# Patient Record
Sex: MALE | Race: WHITE | NOT HISPANIC OR LATINO | Employment: OTHER
[De-identification: names, ages, dates, MRNs, and addresses within clinical notes are randomized per-mention and may not be internally consistent; named-entity substitution may affect disease eponyms.]

---

## 2017-12-21 ENCOUNTER — RECORDS - HEALTHEAST (OUTPATIENT)
Dept: ADMINISTRATIVE | Facility: OTHER | Age: 57
End: 2017-12-21

## 2018-01-24 ENCOUNTER — COMMUNICATION - HEALTHEAST (OUTPATIENT)
Dept: NEUROSURGERY | Facility: CLINIC | Age: 58
End: 2018-01-24

## 2018-01-24 DIAGNOSIS — M54.9 BACK PAIN: ICD-10-CM

## 2018-01-31 ENCOUNTER — RECORDS - HEALTHEAST (OUTPATIENT)
Dept: RADIOLOGY | Facility: CLINIC | Age: 58
End: 2018-01-31

## 2018-01-31 ENCOUNTER — RECORDS - HEALTHEAST (OUTPATIENT)
Dept: ADMINISTRATIVE | Facility: OTHER | Age: 58
End: 2018-01-31

## 2018-03-13 ENCOUNTER — RECORDS - HEALTHEAST (OUTPATIENT)
Dept: ADMINISTRATIVE | Facility: OTHER | Age: 58
End: 2018-03-13

## 2018-03-29 ENCOUNTER — AMBULATORY - HEALTHEAST (OUTPATIENT)
Dept: NEUROSURGERY | Facility: CLINIC | Age: 58
End: 2018-03-29

## 2018-04-17 ENCOUNTER — AMBULATORY - HEALTHEAST (OUTPATIENT)
Dept: NEUROSURGERY | Facility: CLINIC | Age: 58
End: 2018-04-17

## 2018-04-17 ENCOUNTER — OFFICE VISIT - HEALTHEAST (OUTPATIENT)
Dept: NEUROSURGERY | Facility: CLINIC | Age: 58
End: 2018-04-17

## 2018-04-17 ENCOUNTER — COMMUNICATION - HEALTHEAST (OUTPATIENT)
Dept: NEUROSURGERY | Facility: CLINIC | Age: 58
End: 2018-04-17

## 2018-04-17 DIAGNOSIS — M54.16 LUMBAR RADICULOPATHY: ICD-10-CM

## 2018-04-17 DIAGNOSIS — M51.16 RADICULOPATHY DUE TO LUMBAR INTERVERTEBRAL DISC DISORDER: ICD-10-CM

## 2018-04-17 DIAGNOSIS — M54.12 RADICULOPATHY OF CERVICAL REGION: ICD-10-CM

## 2018-04-17 ASSESSMENT — MIFFLIN-ST. JEOR: SCORE: 1374.64

## 2018-05-02 ENCOUNTER — HOSPITAL ENCOUNTER (OUTPATIENT)
Dept: RADIOLOGY | Facility: CLINIC | Age: 58
Discharge: HOME | End: 2018-05-02
Attending: NEUROLOGICAL SURGERY

## 2018-05-02 ENCOUNTER — OFFICE VISIT - HEALTHEAST (OUTPATIENT)
Dept: PHYSICAL THERAPY | Facility: REHABILITATION | Age: 58
End: 2018-05-02

## 2018-05-02 DIAGNOSIS — M62.81 MUSCLE WEAKNESS (GENERALIZED): ICD-10-CM

## 2018-05-02 DIAGNOSIS — M54.12 RADICULITIS OF LEFT CERVICAL REGION: ICD-10-CM

## 2018-05-02 DIAGNOSIS — M54.12 RADICULOPATHY OF CERVICAL REGION: ICD-10-CM

## 2018-05-02 DIAGNOSIS — M54.16 RIGHT LUMBAR RADICULITIS: ICD-10-CM

## 2018-05-02 DIAGNOSIS — R29.3 POOR POSTURE: ICD-10-CM

## 2018-05-02 DIAGNOSIS — M54.9 BACK PAIN: ICD-10-CM

## 2018-05-04 ENCOUNTER — OFFICE VISIT - HEALTHEAST (OUTPATIENT)
Dept: PHYSICAL THERAPY | Facility: REHABILITATION | Age: 58
End: 2018-05-04

## 2018-05-04 DIAGNOSIS — M62.81 MUSCLE WEAKNESS (GENERALIZED): ICD-10-CM

## 2018-05-04 DIAGNOSIS — R29.3 POOR POSTURE: ICD-10-CM

## 2018-05-04 DIAGNOSIS — M54.16 RIGHT LUMBAR RADICULITIS: ICD-10-CM

## 2018-05-04 DIAGNOSIS — M54.12 RADICULITIS OF LEFT CERVICAL REGION: ICD-10-CM

## 2018-05-16 ENCOUNTER — COMMUNICATION - HEALTHEAST (OUTPATIENT)
Dept: NEUROSURGERY | Facility: CLINIC | Age: 58
End: 2018-05-16

## 2018-05-16 ENCOUNTER — OFFICE VISIT - HEALTHEAST (OUTPATIENT)
Dept: PHYSICAL THERAPY | Facility: REHABILITATION | Age: 58
End: 2018-05-16

## 2018-05-16 DIAGNOSIS — M54.12 RADICULITIS OF LEFT CERVICAL REGION: ICD-10-CM

## 2018-05-16 DIAGNOSIS — M62.81 MUSCLE WEAKNESS (GENERALIZED): ICD-10-CM

## 2018-05-16 DIAGNOSIS — M54.16 RIGHT LUMBAR RADICULITIS: ICD-10-CM

## 2018-05-16 DIAGNOSIS — R29.3 POOR POSTURE: ICD-10-CM

## 2018-05-16 DIAGNOSIS — M79.606 LEG PAIN: ICD-10-CM

## 2018-05-18 ENCOUNTER — OFFICE VISIT - HEALTHEAST (OUTPATIENT)
Dept: PHYSICAL THERAPY | Facility: REHABILITATION | Age: 58
End: 2018-05-18

## 2018-05-18 DIAGNOSIS — M62.81 MUSCLE WEAKNESS (GENERALIZED): ICD-10-CM

## 2018-05-18 DIAGNOSIS — M54.16 RIGHT LUMBAR RADICULITIS: ICD-10-CM

## 2018-05-18 DIAGNOSIS — R29.3 POOR POSTURE: ICD-10-CM

## 2018-05-18 DIAGNOSIS — M54.12 RADICULITIS OF LEFT CERVICAL REGION: ICD-10-CM

## 2018-05-23 ENCOUNTER — OFFICE VISIT - HEALTHEAST (OUTPATIENT)
Dept: PHYSICAL THERAPY | Facility: REHABILITATION | Age: 58
End: 2018-05-23

## 2018-05-23 DIAGNOSIS — M54.16 RIGHT LUMBAR RADICULITIS: ICD-10-CM

## 2018-05-23 DIAGNOSIS — M62.81 MUSCLE WEAKNESS (GENERALIZED): ICD-10-CM

## 2018-05-23 DIAGNOSIS — M54.12 RADICULITIS OF LEFT CERVICAL REGION: ICD-10-CM

## 2018-05-23 DIAGNOSIS — R29.3 POOR POSTURE: ICD-10-CM

## 2018-06-04 ENCOUNTER — COMMUNICATION - HEALTHEAST (OUTPATIENT)
Dept: NEUROSURGERY | Facility: CLINIC | Age: 58
End: 2018-06-04

## 2018-06-22 ENCOUNTER — COMMUNICATION - HEALTHEAST (OUTPATIENT)
Dept: NEUROSURGERY | Facility: CLINIC | Age: 58
End: 2018-06-22

## 2018-06-26 ENCOUNTER — RECORDS - HEALTHEAST (OUTPATIENT)
Dept: RADIOLOGY | Facility: CLINIC | Age: 58
End: 2018-06-26

## 2018-06-26 ENCOUNTER — RECORDS - HEALTHEAST (OUTPATIENT)
Dept: ADMINISTRATIVE | Facility: OTHER | Age: 58
End: 2018-06-26

## 2018-06-27 ENCOUNTER — AMBULATORY - HEALTHEAST (OUTPATIENT)
Dept: NEUROSURGERY | Facility: CLINIC | Age: 58
End: 2018-06-27

## 2018-06-27 DIAGNOSIS — M25.551 HIP PAIN, RIGHT: ICD-10-CM

## 2018-06-29 ENCOUNTER — HOSPITAL ENCOUNTER (OUTPATIENT)
Dept: PHYSICAL MEDICINE AND REHAB | Facility: CLINIC | Age: 58
Discharge: HOME | End: 2018-06-29
Attending: NEUROLOGICAL SURGERY

## 2018-06-29 DIAGNOSIS — M48.061 LUMBAR STENOSIS: ICD-10-CM

## 2018-06-29 DIAGNOSIS — M79.18 MYOFASCIAL PAIN: ICD-10-CM

## 2018-06-29 DIAGNOSIS — M54.50 LUMBAR SPINE PAIN: ICD-10-CM

## 2018-06-29 DIAGNOSIS — M51.369 DDD (DEGENERATIVE DISC DISEASE), LUMBAR: ICD-10-CM

## 2018-06-29 ASSESSMENT — MIFFLIN-ST. JEOR: SCORE: 1347.43

## 2018-07-19 ENCOUNTER — HOSPITAL ENCOUNTER (OUTPATIENT)
Dept: PHYSICAL MEDICINE AND REHAB | Facility: CLINIC | Age: 58
Discharge: HOME | End: 2018-07-19
Attending: PHYSICAL MEDICINE & REHABILITATION

## 2018-07-19 DIAGNOSIS — M79.18 MYOFASCIAL PAIN: ICD-10-CM

## 2018-07-19 DIAGNOSIS — F16.283 FLASHBACKS (H): ICD-10-CM

## 2018-07-19 DIAGNOSIS — M54.16 LUMBAR RADICULAR PAIN: ICD-10-CM

## 2018-07-19 DIAGNOSIS — M48.061 LUMBAR FORAMINAL STENOSIS: ICD-10-CM

## 2018-07-19 DIAGNOSIS — M51.369 DDD (DEGENERATIVE DISC DISEASE), LUMBAR: ICD-10-CM

## 2018-07-19 DIAGNOSIS — M54.50 LUMBAR SPINE PAIN: ICD-10-CM

## 2018-07-19 DIAGNOSIS — M25.551 HIP PAIN, RIGHT: ICD-10-CM

## 2018-07-19 ASSESSMENT — MIFFLIN-ST. JEOR: SCORE: 1342.89

## 2018-07-25 ENCOUNTER — HOSPITAL ENCOUNTER (OUTPATIENT)
Dept: MRI IMAGING | Facility: CLINIC | Age: 58
Discharge: HOME | End: 2018-07-25
Attending: PHYSICAL MEDICINE & REHABILITATION

## 2018-07-25 DIAGNOSIS — M25.551 HIP PAIN, RIGHT: ICD-10-CM

## 2018-07-27 ENCOUNTER — COMMUNICATION - HEALTHEAST (OUTPATIENT)
Dept: PHYSICAL MEDICINE AND REHAB | Facility: CLINIC | Age: 58
End: 2018-07-27

## 2018-08-03 ENCOUNTER — HOSPITAL ENCOUNTER (OUTPATIENT)
Dept: PHYSICAL MEDICINE AND REHAB | Facility: CLINIC | Age: 58
Discharge: HOME | End: 2018-08-03
Attending: PHYSICAL MEDICINE & REHABILITATION

## 2018-08-03 DIAGNOSIS — M54.16 LUMBAR RADICULAR PAIN: ICD-10-CM

## 2018-08-03 DIAGNOSIS — M48.061 LUMBAR FORAMINAL STENOSIS: ICD-10-CM

## 2018-08-03 DIAGNOSIS — M51.369 DDD (DEGENERATIVE DISC DISEASE), LUMBAR: ICD-10-CM

## 2018-08-03 DIAGNOSIS — M54.50 LUMBAR SPINE PAIN: ICD-10-CM

## 2018-08-03 DIAGNOSIS — Z72.820 POOR SLEEP: ICD-10-CM

## 2018-08-03 ASSESSMENT — MIFFLIN-ST. JEOR: SCORE: 1338.35

## 2018-09-04 ENCOUNTER — OFFICE VISIT - HEALTHEAST (OUTPATIENT)
Dept: NEUROSURGERY | Facility: CLINIC | Age: 58
End: 2018-09-04

## 2018-09-04 DIAGNOSIS — M54.10 RADICULAR LEG PAIN: ICD-10-CM

## 2018-09-06 ENCOUNTER — HOSPITAL ENCOUNTER (OUTPATIENT)
Dept: RADIOLOGY | Facility: CLINIC | Age: 58
Discharge: HOME | End: 2018-09-06
Attending: NEUROLOGICAL SURGERY

## 2018-09-06 ENCOUNTER — HOSPITAL ENCOUNTER (OUTPATIENT)
Dept: MRI IMAGING | Facility: CLINIC | Age: 58
Discharge: HOME | End: 2018-09-06
Attending: NEUROLOGICAL SURGERY

## 2018-09-06 DIAGNOSIS — M54.10 RADICULAR LEG PAIN: ICD-10-CM

## 2018-09-17 ENCOUNTER — HOSPITAL ENCOUNTER (OUTPATIENT)
Dept: PHYSICAL MEDICINE AND REHAB | Facility: CLINIC | Age: 58
Discharge: HOME | End: 2018-09-17
Attending: PHYSICAL MEDICINE & REHABILITATION

## 2018-09-17 DIAGNOSIS — F43.10 PTSD (POST-TRAUMATIC STRESS DISORDER): ICD-10-CM

## 2018-09-18 ENCOUNTER — OFFICE VISIT - HEALTHEAST (OUTPATIENT)
Dept: NEUROSURGERY | Facility: CLINIC | Age: 58
End: 2018-09-18

## 2018-09-18 DIAGNOSIS — M54.16 LUMBAR RADICULOPATHY: ICD-10-CM

## 2018-09-24 ENCOUNTER — COMMUNICATION - HEALTHEAST (OUTPATIENT)
Dept: NEUROSURGERY | Facility: CLINIC | Age: 58
End: 2018-09-24

## 2018-09-26 ENCOUNTER — RECORDS - HEALTHEAST (OUTPATIENT)
Dept: ADMINISTRATIVE | Facility: OTHER | Age: 58
End: 2018-09-26

## 2018-10-03 ENCOUNTER — AMBULATORY - HEALTHEAST (OUTPATIENT)
Dept: NEUROSURGERY | Facility: CLINIC | Age: 58
End: 2018-10-03

## 2018-10-04 ENCOUNTER — OFFICE VISIT - HEALTHEAST (OUTPATIENT)
Dept: NEUROSURGERY | Facility: CLINIC | Age: 58
End: 2018-10-04

## 2018-10-04 DIAGNOSIS — Z01.818 PRE-OP EXAM: ICD-10-CM

## 2018-10-08 ENCOUNTER — AMBULATORY - HEALTHEAST (OUTPATIENT)
Dept: LAB | Facility: CLINIC | Age: 58
End: 2018-10-08

## 2018-10-08 DIAGNOSIS — Z01.818 PRE-OP EXAM: ICD-10-CM

## 2018-10-08 LAB — CLOSURE TME COLL+EPINEP BLD: 95 SEC (ref 1–180)

## 2018-10-09 ENCOUNTER — ANESTHESIA - HEALTHEAST (OUTPATIENT)
Dept: SURGERY | Facility: CLINIC | Age: 58
End: 2018-10-09

## 2018-10-09 ENCOUNTER — SURGERY - HEALTHEAST (OUTPATIENT)
Dept: SURGERY | Facility: CLINIC | Age: 58
End: 2018-10-09

## 2018-10-09 ASSESSMENT — MIFFLIN-ST. JEOR
SCORE: 1321.57
SCORE: 1342.89

## 2018-10-10 ENCOUNTER — COMMUNICATION - HEALTHEAST (OUTPATIENT)
Dept: NEUROSURGERY | Facility: CLINIC | Age: 58
End: 2018-10-10

## 2018-10-10 DIAGNOSIS — G89.18 ACUTE POST-OPERATIVE PAIN: ICD-10-CM

## 2018-10-10 DIAGNOSIS — M54.9 BACK PAIN: ICD-10-CM

## 2018-10-26 ENCOUNTER — AMBULATORY - HEALTHEAST (OUTPATIENT)
Dept: NEUROSURGERY | Facility: CLINIC | Age: 58
End: 2018-10-26

## 2018-10-31 ENCOUNTER — HOSPITAL ENCOUNTER (OUTPATIENT)
Dept: PHYSICAL MEDICINE AND REHAB | Facility: CLINIC | Age: 58
Discharge: HOME | End: 2018-10-31
Attending: SOCIAL WORKER

## 2018-10-31 DIAGNOSIS — F43.10 PTSD (POST-TRAUMATIC STRESS DISORDER): ICD-10-CM

## 2018-11-06 ENCOUNTER — HOSPITAL ENCOUNTER (OUTPATIENT)
Dept: PHYSICAL MEDICINE AND REHAB | Facility: CLINIC | Age: 58
Discharge: HOME | End: 2018-11-06
Attending: SOCIAL WORKER

## 2018-11-06 ENCOUNTER — AMBULATORY - HEALTHEAST (OUTPATIENT)
Dept: PHYSICAL MEDICINE AND REHAB | Facility: CLINIC | Age: 58
End: 2018-11-06

## 2018-11-06 DIAGNOSIS — F43.10 PTSD (POST-TRAUMATIC STRESS DISORDER): ICD-10-CM

## 2018-11-12 ENCOUNTER — HOSPITAL ENCOUNTER (OUTPATIENT)
Dept: PHYSICAL MEDICINE AND REHAB | Facility: CLINIC | Age: 58
Discharge: HOME | End: 2018-11-12
Attending: SOCIAL WORKER

## 2018-11-12 DIAGNOSIS — F43.10 PTSD (POST-TRAUMATIC STRESS DISORDER): ICD-10-CM

## 2018-11-16 ENCOUNTER — OFFICE VISIT - HEALTHEAST (OUTPATIENT)
Dept: NEUROSURGERY | Facility: CLINIC | Age: 58
End: 2018-11-16

## 2018-11-16 DIAGNOSIS — M54.16 LUMBAR RADICULOPATHY: ICD-10-CM

## 2018-11-16 ASSESSMENT — MIFFLIN-ST. JEOR: SCORE: 1342.89

## 2018-12-10 ENCOUNTER — HOSPITAL ENCOUNTER (OUTPATIENT)
Dept: PHYSICAL MEDICINE AND REHAB | Facility: CLINIC | Age: 58
Discharge: HOME | End: 2018-12-10
Attending: SOCIAL WORKER

## 2018-12-10 DIAGNOSIS — F43.10 PTSD (POST-TRAUMATIC STRESS DISORDER): ICD-10-CM

## 2018-12-18 ENCOUNTER — HOSPITAL ENCOUNTER (OUTPATIENT)
Dept: PHYSICAL MEDICINE AND REHAB | Facility: CLINIC | Age: 58
Discharge: HOME | End: 2018-12-18
Attending: SOCIAL WORKER

## 2018-12-18 DIAGNOSIS — F43.10 PTSD (POST-TRAUMATIC STRESS DISORDER): ICD-10-CM

## 2019-01-07 ENCOUNTER — HOSPITAL ENCOUNTER (OUTPATIENT)
Dept: PHYSICAL MEDICINE AND REHAB | Facility: CLINIC | Age: 59
Discharge: HOME | End: 2019-01-07
Attending: SOCIAL WORKER

## 2019-01-07 DIAGNOSIS — F43.10 PTSD (POST-TRAUMATIC STRESS DISORDER): ICD-10-CM

## 2019-01-14 ENCOUNTER — HOSPITAL ENCOUNTER (OUTPATIENT)
Dept: PHYSICAL MEDICINE AND REHAB | Facility: CLINIC | Age: 59
Discharge: HOME | End: 2019-01-14
Attending: SOCIAL WORKER

## 2019-01-14 DIAGNOSIS — F43.10 PTSD (POST-TRAUMATIC STRESS DISORDER): ICD-10-CM

## 2019-01-21 ENCOUNTER — HOSPITAL ENCOUNTER (OUTPATIENT)
Dept: PHYSICAL MEDICINE AND REHAB | Facility: CLINIC | Age: 59
Discharge: HOME | End: 2019-01-21
Attending: SOCIAL WORKER

## 2019-01-21 DIAGNOSIS — F43.10 PTSD (POST-TRAUMATIC STRESS DISORDER): ICD-10-CM

## 2019-02-04 ENCOUNTER — HOSPITAL ENCOUNTER (OUTPATIENT)
Dept: PHYSICAL MEDICINE AND REHAB | Facility: CLINIC | Age: 59
Discharge: HOME | End: 2019-02-04
Attending: SOCIAL WORKER

## 2019-02-04 DIAGNOSIS — F43.10 PTSD (POST-TRAUMATIC STRESS DISORDER): ICD-10-CM

## 2019-03-04 ENCOUNTER — AMBULATORY - HEALTHEAST (OUTPATIENT)
Dept: PHYSICAL MEDICINE AND REHAB | Facility: CLINIC | Age: 59
End: 2019-03-04

## 2019-03-04 ENCOUNTER — HOSPITAL ENCOUNTER (OUTPATIENT)
Dept: PHYSICAL MEDICINE AND REHAB | Facility: CLINIC | Age: 59
Discharge: HOME | End: 2019-03-04
Attending: SOCIAL WORKER

## 2019-03-04 DIAGNOSIS — F43.10 PTSD (POST-TRAUMATIC STRESS DISORDER): ICD-10-CM

## 2019-03-19 ENCOUNTER — HOSPITAL ENCOUNTER (OUTPATIENT)
Dept: PHYSICAL MEDICINE AND REHAB | Facility: CLINIC | Age: 59
Discharge: HOME | End: 2019-03-19
Attending: SOCIAL WORKER

## 2019-03-19 DIAGNOSIS — F43.10 PTSD (POST-TRAUMATIC STRESS DISORDER): ICD-10-CM

## 2019-04-01 ENCOUNTER — HOSPITAL ENCOUNTER (OUTPATIENT)
Dept: PHYSICAL MEDICINE AND REHAB | Facility: CLINIC | Age: 59
Discharge: HOME | End: 2019-04-01
Attending: SOCIAL WORKER

## 2019-04-01 DIAGNOSIS — F43.10 PTSD (POST-TRAUMATIC STRESS DISORDER): ICD-10-CM

## 2019-04-15 ENCOUNTER — HOSPITAL ENCOUNTER (OUTPATIENT)
Dept: PHYSICAL MEDICINE AND REHAB | Facility: CLINIC | Age: 59
Discharge: HOME | End: 2019-04-15
Attending: SOCIAL WORKER

## 2019-04-15 DIAGNOSIS — F43.10 PTSD (POST-TRAUMATIC STRESS DISORDER): ICD-10-CM

## 2019-05-20 ENCOUNTER — HOSPITAL ENCOUNTER (OUTPATIENT)
Dept: PHYSICAL MEDICINE AND REHAB | Facility: CLINIC | Age: 59
Discharge: HOME | End: 2019-05-20
Attending: SOCIAL WORKER

## 2019-05-20 DIAGNOSIS — F43.10 PTSD (POST-TRAUMATIC STRESS DISORDER): ICD-10-CM

## 2019-07-15 ENCOUNTER — HOSPITAL ENCOUNTER (OUTPATIENT)
Dept: PHYSICAL MEDICINE AND REHAB | Facility: CLINIC | Age: 59
Discharge: HOME | End: 2019-07-15
Attending: SOCIAL WORKER

## 2019-07-15 DIAGNOSIS — F43.10 PTSD (POST-TRAUMATIC STRESS DISORDER): ICD-10-CM

## 2019-07-31 ENCOUNTER — AMBULATORY - HEALTHEAST (OUTPATIENT)
Dept: PHYSICAL MEDICINE AND REHAB | Facility: CLINIC | Age: 59
End: 2019-07-31

## 2020-08-21 ENCOUNTER — OFFICE VISIT - HEALTHEAST (OUTPATIENT)
Dept: INTERNAL MEDICINE | Facility: CLINIC | Age: 60
End: 2020-08-21

## 2020-08-21 DIAGNOSIS — E78.5 HYPERLIPIDEMIA LDL GOAL <100: ICD-10-CM

## 2020-08-21 DIAGNOSIS — Z72.51 UNPROTECTED SEXUAL INTERCOURSE: ICD-10-CM

## 2020-08-21 DIAGNOSIS — R30.0 DYSURIA: ICD-10-CM

## 2020-08-21 DIAGNOSIS — F11.11 HX OF OPIOID ABUSE (H): ICD-10-CM

## 2020-08-21 DIAGNOSIS — Z13.21 SCREENING FOR ENDOCRINE, NUTRITIONAL, METABOLIC AND IMMUNITY DISORDER: ICD-10-CM

## 2020-08-21 DIAGNOSIS — F43.10 PTSD (POST-TRAUMATIC STRESS DISORDER): ICD-10-CM

## 2020-08-21 DIAGNOSIS — I10 ESSENTIAL HYPERTENSION: ICD-10-CM

## 2020-08-21 DIAGNOSIS — Z00.00 ENCOUNTER FOR ANNUAL PHYSICAL EXAM: ICD-10-CM

## 2020-08-21 DIAGNOSIS — F10.20 ALCOHOLISM (H): ICD-10-CM

## 2020-08-21 DIAGNOSIS — R06.02 SOB (SHORTNESS OF BREATH): ICD-10-CM

## 2020-08-21 DIAGNOSIS — Z13.0 SCREENING FOR ENDOCRINE, NUTRITIONAL, METABOLIC AND IMMUNITY DISORDER: ICD-10-CM

## 2020-08-21 DIAGNOSIS — Z13.29 SCREENING FOR ENDOCRINE, NUTRITIONAL, METABOLIC AND IMMUNITY DISORDER: ICD-10-CM

## 2020-08-21 DIAGNOSIS — Z13.228 SCREENING FOR ENDOCRINE, NUTRITIONAL, METABOLIC AND IMMUNITY DISORDER: ICD-10-CM

## 2020-08-21 DIAGNOSIS — Z12.5 SCREENING FOR PROSTATE CANCER: ICD-10-CM

## 2020-08-21 DIAGNOSIS — R07.89 SENSATION OF CHEST PRESSURE: ICD-10-CM

## 2020-08-21 DIAGNOSIS — F41.9 ANXIETY: ICD-10-CM

## 2020-08-21 DIAGNOSIS — Z00.00 ENCOUNTER FOR MEDICAL EXAMINATION TO ESTABLISH CARE: ICD-10-CM

## 2020-08-21 LAB
ALBUMIN SERPL-MCNC: 4.4 G/DL (ref 3.5–5)
ALBUMIN UR-MCNC: NEGATIVE MG/DL
ALP SERPL-CCNC: 90 U/L (ref 45–120)
ALT SERPL W P-5'-P-CCNC: 41 U/L (ref 0–45)
ANION GAP SERPL CALCULATED.3IONS-SCNC: 12 MMOL/L (ref 5–18)
APPEARANCE UR: CLEAR
AST SERPL W P-5'-P-CCNC: 32 U/L (ref 0–40)
ATRIAL RATE - MUSE: 50 BPM
BACTERIA #/AREA URNS HPF: ABNORMAL HPF
BASOPHILS # BLD AUTO: 0.1 THOU/UL (ref 0–0.2)
BASOPHILS NFR BLD AUTO: 1 % (ref 0–2)
BILIRUB SERPL-MCNC: 1.1 MG/DL (ref 0–1)
BILIRUB UR QL STRIP: NEGATIVE
BUN SERPL-MCNC: 12 MG/DL (ref 8–22)
CALCIUM SERPL-MCNC: 9.6 MG/DL (ref 8.5–10.5)
CHLORIDE BLD-SCNC: 103 MMOL/L (ref 98–107)
CHOLEST SERPL-MCNC: 189 MG/DL
CO2 SERPL-SCNC: 26 MMOL/L (ref 22–31)
COLOR UR AUTO: YELLOW
CREAT SERPL-MCNC: 0.82 MG/DL (ref 0.7–1.3)
DIASTOLIC BLOOD PRESSURE - MUSE: NORMAL
EOSINOPHIL # BLD AUTO: 0.3 THOU/UL (ref 0–0.4)
EOSINOPHIL NFR BLD AUTO: 5 % (ref 0–6)
ERYTHROCYTE [DISTWIDTH] IN BLOOD BY AUTOMATED COUNT: 11.2 % (ref 11–14.5)
FASTING STATUS PATIENT QL REPORTED: YES
GFR SERPL CREATININE-BSD FRML MDRD: >60 ML/MIN/1.73M2
GLUCOSE BLD-MCNC: 89 MG/DL (ref 70–125)
GLUCOSE UR STRIP-MCNC: NEGATIVE MG/DL
HCT VFR BLD AUTO: 48.3 % (ref 40–54)
HDLC SERPL-MCNC: 63 MG/DL
HGB BLD-MCNC: 16.9 G/DL (ref 14–18)
HGB UR QL STRIP: ABNORMAL
HIV 1+2 AB+HIV1 P24 AG SERPL QL IA: NEGATIVE
INTERPRETATION ECG - MUSE: NORMAL
KETONES UR STRIP-MCNC: NEGATIVE MG/DL
LDLC SERPL CALC-MCNC: 105 MG/DL
LEUKOCYTE ESTERASE UR QL STRIP: NEGATIVE
LYMPHOCYTES # BLD AUTO: 2 THOU/UL (ref 0.8–4.4)
LYMPHOCYTES NFR BLD AUTO: 31 % (ref 20–40)
MCH RBC QN AUTO: 37.5 PG (ref 27–34)
MCHC RBC AUTO-ENTMCNC: 34.9 G/DL (ref 32–36)
MCV RBC AUTO: 107 FL (ref 80–100)
MONOCYTES # BLD AUTO: 0.6 THOU/UL (ref 0–0.9)
MONOCYTES NFR BLD AUTO: 9 % (ref 2–10)
MUCOUS THREADS #/AREA URNS LPF: ABNORMAL LPF
NEUTROPHILS # BLD AUTO: 3.5 THOU/UL (ref 2–7.7)
NEUTROPHILS NFR BLD AUTO: 54 % (ref 50–70)
NITRATE UR QL: NEGATIVE
P AXIS - MUSE: 81 DEGREES
PH UR STRIP: 6 [PH] (ref 5–8)
PLATELET # BLD AUTO: 298 THOU/UL (ref 140–440)
PMV BLD AUTO: 7.4 FL (ref 7–10)
POTASSIUM BLD-SCNC: 4 MMOL/L (ref 3.5–5)
PR INTERVAL - MUSE: 144 MS
PROT SERPL-MCNC: 6.9 G/DL (ref 6–8)
PSA SERPL-MCNC: 0.6 NG/ML (ref 0–3.5)
QRS DURATION - MUSE: 84 MS
QT - MUSE: 476 MS
QTC - MUSE: 433 MS
R AXIS - MUSE: -53 DEGREES
RBC # BLD AUTO: 4.5 MILL/UL (ref 4.4–6.2)
RBC #/AREA URNS AUTO: ABNORMAL HPF
SODIUM SERPL-SCNC: 141 MMOL/L (ref 136–145)
SP GR UR STRIP: 1.01 (ref 1–1.03)
SQUAMOUS #/AREA URNS AUTO: ABNORMAL LPF
SYSTOLIC BLOOD PRESSURE - MUSE: NORMAL
T AXIS - MUSE: 48 DEGREES
TRIGL SERPL-MCNC: 106 MG/DL
TSH SERPL DL<=0.005 MIU/L-ACNC: 1.25 UIU/ML (ref 0.3–5)
UROBILINOGEN UR STRIP-ACNC: ABNORMAL
VENTRICULAR RATE- MUSE: 50 BPM
VIT B12 SERPL-MCNC: 340 PG/ML (ref 213–816)
WBC #/AREA URNS AUTO: ABNORMAL HPF
WBC: 6.4 THOU/UL (ref 4–11)

## 2020-08-21 RX ORDER — ALBUTEROL SULFATE 90 UG/1
1-2 AEROSOL, METERED RESPIRATORY (INHALATION) EVERY 6 HOURS PRN
Qty: 1 INHALER | Refills: 3 | Status: SHIPPED | OUTPATIENT
Start: 2020-08-21 | End: 2021-09-24

## 2020-08-22 LAB
HBV SURFACE AG SERPL QL IA: NEGATIVE
T PALLIDUM AB SER QL: NEGATIVE

## 2020-08-24 ENCOUNTER — COMMUNICATION - HEALTHEAST (OUTPATIENT)
Dept: INTERNAL MEDICINE | Facility: CLINIC | Age: 60
End: 2020-08-24

## 2020-08-24 DIAGNOSIS — E56.9 VITAMIN DEFICIENCY: ICD-10-CM

## 2020-08-24 DIAGNOSIS — I10 ESSENTIAL HYPERTENSION: ICD-10-CM

## 2020-08-24 DIAGNOSIS — E53.8 FOLIC ACID DEFICIENCY: ICD-10-CM

## 2020-08-24 DIAGNOSIS — U07.1 COVID-19: ICD-10-CM

## 2020-08-24 DIAGNOSIS — E53.8 VITAMIN B12 DEFICIENCY (NON ANEMIC): ICD-10-CM

## 2020-08-24 LAB
25(OH)D3 SERPL-MCNC: 25.9 NG/ML (ref 30–80)
HCV AB SERPL QL IA: NEGATIVE

## 2020-08-25 LAB
C TRACH DNA SPEC QL PROBE+SIG AMP: NEGATIVE
N GONORRHOEA DNA SPEC QL NAA+PROBE: NEGATIVE

## 2020-08-26 ENCOUNTER — AMBULATORY - HEALTHEAST (OUTPATIENT)
Dept: FAMILY MEDICINE | Facility: CLINIC | Age: 60
End: 2020-08-26

## 2020-08-26 DIAGNOSIS — U07.1 COVID-19: ICD-10-CM

## 2020-08-27 ENCOUNTER — COMMUNICATION - HEALTHEAST (OUTPATIENT)
Dept: PEDIATRICS | Facility: CLINIC | Age: 60
End: 2020-08-27

## 2020-08-27 RX ORDER — LANOLIN ALCOHOL/MO/W.PET/CERES
100 CREAM (GRAM) TOPICAL DAILY
Refills: 0 | Status: SHIPPED | COMMUNITY
Start: 2020-08-27 | End: 2023-01-25

## 2020-08-27 RX ORDER — FOLIC ACID 1 MG/1
1 TABLET ORAL DAILY
Qty: 90 TABLET | Refills: 3 | Status: SHIPPED | OUTPATIENT
Start: 2020-08-27 | End: 2021-09-15

## 2020-08-28 ENCOUNTER — COMMUNICATION - HEALTHEAST (OUTPATIENT)
Dept: SCHEDULING | Facility: CLINIC | Age: 60
End: 2020-08-28

## 2020-10-16 ENCOUNTER — COMMUNICATION - HEALTHEAST (OUTPATIENT)
Dept: INTERNAL MEDICINE | Facility: CLINIC | Age: 60
End: 2020-10-16

## 2020-10-16 DIAGNOSIS — M71.30 SYNOVIAL CYST: ICD-10-CM

## 2020-10-19 RX ORDER — ASPIRIN 325 MG
325 TABLET, DELAYED RELEASE (ENTERIC COATED) ORAL DAILY
Qty: 90 TABLET | Refills: 1 | Status: SHIPPED | OUTPATIENT
Start: 2020-10-19 | End: 2022-09-01

## 2021-03-15 ENCOUNTER — COMMUNICATION - HEALTHEAST (OUTPATIENT)
Dept: INTERNAL MEDICINE | Facility: CLINIC | Age: 61
End: 2021-03-15

## 2021-03-15 ENCOUNTER — RECORDS - HEALTHEAST (OUTPATIENT)
Dept: GENERAL RADIOLOGY | Facility: CLINIC | Age: 61
End: 2021-03-15

## 2021-03-15 ENCOUNTER — OFFICE VISIT - HEALTHEAST (OUTPATIENT)
Dept: INTERNAL MEDICINE | Facility: CLINIC | Age: 61
End: 2021-03-15

## 2021-03-15 DIAGNOSIS — R07.89 SENSATION OF CHEST PRESSURE: ICD-10-CM

## 2021-03-15 DIAGNOSIS — N40.0 ENLARGED PROSTATE: ICD-10-CM

## 2021-03-15 DIAGNOSIS — I44.4 LEFT ANTERIOR FASCICULAR BLOCK: ICD-10-CM

## 2021-03-15 DIAGNOSIS — R07.9 CHEST PAIN, UNSPECIFIED TYPE: ICD-10-CM

## 2021-03-15 DIAGNOSIS — M79.672 LEFT FOOT PAIN: ICD-10-CM

## 2021-03-15 DIAGNOSIS — I10 BENIGN ESSENTIAL HYPERTENSION: ICD-10-CM

## 2021-03-15 DIAGNOSIS — K04.7 DENTAL ABSCESS: ICD-10-CM

## 2021-03-15 DIAGNOSIS — M79.672 PAIN IN LEFT FOOT: ICD-10-CM

## 2021-03-15 DIAGNOSIS — G62.1 ALCOHOL-INDUCED POLYNEUROPATHY (H): ICD-10-CM

## 2021-03-15 DIAGNOSIS — Z72.0 TOBACCO ABUSE DISORDER: ICD-10-CM

## 2021-03-15 RX ORDER — TAMSULOSIN HYDROCHLORIDE 0.4 MG/1
0.4 CAPSULE ORAL DAILY
Qty: 90 CAPSULE | Refills: 3 | Status: SHIPPED | OUTPATIENT
Start: 2021-03-15 | End: 2022-03-28

## 2021-04-01 ENCOUNTER — HOSPITAL ENCOUNTER (OUTPATIENT)
Dept: CARDIOLOGY | Facility: CLINIC | Age: 61
Discharge: HOME | End: 2021-04-01

## 2021-04-01 DIAGNOSIS — I44.4 LEFT ANTERIOR FASCICULAR BLOCK: ICD-10-CM

## 2021-04-01 LAB
AORTIC ROOT: 2.7 CM
BSA FOR ECHO PROCEDURE: 1.66 M2
CV ECHO HEIGHT: 66 IN
CV ECHO WEIGHT: 131 LBS
DOP CALC LVOT PEAK VEL: 96.8 CM/S
DOP CALCLVOT PEAK VEL VTI: 18.1 CM
EJECTION FRACTION: 66 % (ref 55–75)
FRACTIONAL SHORTENING: 34.6 % (ref 28–44)
INTERVENTRICULAR SEPTUM IN END DIASTOLE: 0.88 CM (ref 0.6–1)
IVS/PW RATIO: 0.9
LEFT ATRIUM SIZE: 2.6 CM
LEFT ATRIUM TO AORTIC ROOT RATIO: 0.96 NO UNITS
LEFT VENTRICLE DIASTOLIC VOLUME INDEX: 50.5 CM3/M2 (ref 34–74)
LEFT VENTRICLE DIASTOLIC VOLUME: 83.8 CM3 (ref 62–150)
LEFT VENTRICLE HEART RATE: 60 BPM
LEFT VENTRICLE MASS INDEX: 86.1 G/M2
LEFT VENTRICLE SYSTOLIC VOLUME INDEX: 17.1 CM3/M2 (ref 11–31)
LEFT VENTRICLE SYSTOLIC VOLUME: 28.4 CM3 (ref 21–61)
LEFT VENTRICULAR INTERNAL DIMENSION IN DIASTOLE: 4.57 CM (ref 4.2–5.8)
LEFT VENTRICULAR INTERNAL DIMENSION IN SYSTOLE: 2.99 CM (ref 2.5–4)
LEFT VENTRICULAR MASS: 143 G
LEFT VENTRICULAR OUTFLOW TRACT MEAN GRADIENT: 2 MMHG
LEFT VENTRICULAR OUTFLOW TRACT MEAN VELOCITY: 56.2 CM/S
LEFT VENTRICULAR OUTFLOW TRACT PEAK GRADIENT: 4 MMHG
LEFT VENTRICULAR POSTERIOR WALL IN END DIASTOLE: 0.98 CM (ref 0.6–1)
MITRAL VALVE E/A RATIO: 0.9
MV AVERAGE E/E' RATIO: 3.5 CM/S
MV DECELERATION TIME: 331 MS
MV E'TISSUE VEL-LAT: 13.4 CM/S
MV E'TISSUE VEL-MED: 8.38 CM/S
MV LATERAL E/E' RATIO: 2.8
MV MEDIAL E/E' RATIO: 4.5
MV PEAK A VELOCITY: 42.2 CM/S
MV PEAK E VELOCITY: 37.8 CM/S
NUC REST DIASTOLIC VOLUME INDEX: 2096 LBS
NUC REST SYSTOLIC VOLUME INDEX: 66 IN
TRICUSPID REGURGITATION PEAK PRESSURE GRADIENT: 21 MMHG
TRICUSPID VALVE PEAK REGURGITANT VELOCITY: 229 CM/S

## 2021-04-01 ASSESSMENT — MIFFLIN-ST. JEOR: SCORE: 1351.96

## 2021-04-02 ENCOUNTER — COMMUNICATION - HEALTHEAST (OUTPATIENT)
Dept: INTERNAL MEDICINE | Facility: CLINIC | Age: 61
End: 2021-04-02

## 2021-04-15 ENCOUNTER — OFFICE VISIT - HEALTHEAST (OUTPATIENT)
Dept: INTERNAL MEDICINE | Facility: CLINIC | Age: 61
End: 2021-04-15

## 2021-04-15 DIAGNOSIS — Z72.0 TOBACCO ABUSE DISORDER: ICD-10-CM

## 2021-04-15 DIAGNOSIS — Z13.0 SCREENING FOR ENDOCRINE, NUTRITIONAL, METABOLIC AND IMMUNITY DISORDER: ICD-10-CM

## 2021-04-15 DIAGNOSIS — Z13.228 SCREENING FOR ENDOCRINE, NUTRITIONAL, METABOLIC AND IMMUNITY DISORDER: ICD-10-CM

## 2021-04-15 DIAGNOSIS — G47.00 INSOMNIA, UNSPECIFIED TYPE: ICD-10-CM

## 2021-04-15 DIAGNOSIS — Z13.29 SCREENING FOR ENDOCRINE, NUTRITIONAL, METABOLIC AND IMMUNITY DISORDER: ICD-10-CM

## 2021-04-15 DIAGNOSIS — Z13.21 SCREENING FOR ENDOCRINE, NUTRITIONAL, METABOLIC AND IMMUNITY DISORDER: ICD-10-CM

## 2021-04-15 DIAGNOSIS — I10 ESSENTIAL HYPERTENSION: ICD-10-CM

## 2021-04-15 DIAGNOSIS — G62.1 ALCOHOL-INDUCED POLYNEUROPATHY (H): ICD-10-CM

## 2021-04-15 RX ORDER — AMLODIPINE BESYLATE 10 MG/1
10 TABLET ORAL DAILY
Qty: 90 TABLET | Refills: 0 | Status: SHIPPED | OUTPATIENT
Start: 2021-04-15 | End: 2021-08-23

## 2021-04-21 ENCOUNTER — AMBULATORY - HEALTHEAST (OUTPATIENT)
Dept: NURSING | Facility: CLINIC | Age: 61
End: 2021-04-21

## 2021-05-12 ENCOUNTER — AMBULATORY - HEALTHEAST (OUTPATIENT)
Dept: NURSING | Facility: CLINIC | Age: 61
End: 2021-05-12

## 2021-05-27 NOTE — PROGRESS NOTES
MENTAL HEALTH VISIT NOTE    Date of Service: 4/15/2019    Start time:3:00 pm  Stop time:3:55 pm  This is session number 8 this calendar year.  The patient was seen for individual psychotherapy    No  was required because the patient speaks and understands English.  Rashid Navarro Jr. is a 58 y.o. male is being seen today for individual psychotherapy to address the following:    Chief Complaint   Patient presents with     PTSD   .     NECESSITY: This individual session was necessary for the care of the patient to address difficulties in psychosocial functioning that are affected by and affect the patient's ability to cope with and heal from spinal conditions and are affected by the patient's mental health diagnosis listed in the diagnosis section below.    New symptoms or complaints: None    Functional Impairment (0-4):   Personal: 3  Family: 3  Work: 2  Social:2    Clinical Assessment of Mental Status  Mood: Anxious    Affect:   Congruent with content of speech    Appearance:  well-groomed, casually dressed, engaged    Speech:  Within normal limits    Orientation:   Oriented to person, time, and place    Memory:  No evidence of impairment.    Concentration:  Within normal limits    Focus:   Within normal limits    Fund of knowledge:  adequate    Suicidal Ideation present:   Absent  Suicidal Risk Assessment:  No specific plan to harm self.  Patient does not endorse any intention to harm self.  Patient is aware of resources available if he experiences suicidal ideation.      Homicidal Risk Assessment:   None reported  No crisis plan required due to absence of risk.    Patient's impression of their current status:  Symptoms are slowly improving.  He does notice some improvement with exposure to triggers.    Therapist impression of patient's current status: Continuing to improve overall.  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    Type of  psychotherapeutic technique provided:  CBT, motivational interviewing, discussion of in vivo exposure as a possible way to treat PTSD symptoms    Response to psychotherapeutic interventions:  Patient responded to interventions in the following manner: He is amenable to in vivo exposure.  He is not interested in continuing EMDR as he did not find it effective despite the fact that he has noticed a reduction in symptoms.  He is not interested in using clinical hypnosis at this time.    Progress toward short term goals:  Progress as expected: Patient is practicing skills taught in psychotherapy and seeing benefits.    Review of long-term goals:  Not done at today's visit. Date of last review of long term goals is  3/4/19  Goals read as follows: 1. Reduce the amount that my father's behaviors and family stressors upset me from 7-8/10 to 4/10.  2. Decrease the amount that PTSD symptoms bother me from a range of 3/10 to 5/10 down to a range of 0/10 to 3/10.    Diagnosis:   1. PTSD (post-traumatic stress disorder)      Plan and Follow-Up:  Patient will return for follow-up psychotherapy session in one-two weeks.    Patient will complete the following homework before our next session:  Patient plans to continue working with above-mentioned skills.    Discharge Criteria/ Planning:  Patient will continue with follow-up until therapy can be discontinued without return of symptoms.      Linda Carrillo 4/15/2019

## 2021-05-28 NOTE — PROGRESS NOTES
MENTAL HEALTH VISIT NOTE    Date of Service: 5/20/2019    Start time:2:05 pm  Stop time:2:50 pm  This is session number 9 this calendar year.  The patient was seen for individual psychotherapy    No  was required because the patient speaks and understands English.  Rashid Navarro Jr. is a 58 y.o. male is being seen today for individual psychotherapy to address the following:  No chief complaint on file.  .     NECESSITY: This individual session was necessary for the care of the patient to address difficulties in psychosocial functioning that are affected by and affect the patient's ability to cope with and heal from spinal conditions and are affected by the patient's mental health diagnosis listed in the diagnosis section below.    New symptoms or complaints: None    Functional Impairment (0-4):   Personal: 2  Family: 3  Work: 3  Social:2    Clinical Assessment of Mental Status  Mood: Irritable and Anxious    Affect:   Congruent with content of speech    Appearance:  casually-dressed, , disheveled, and engaged,    Speech:  Within normal limits    Orientation:   Oriented to person, time, and place    Memory:  No evidence of impairment.    Concentration:  Within normal limits    Focus:   Within normal limits    Fund of knowledge:  adequate    Suicidal Ideation present:   Absent  Suicidal Risk Assessment:  No specific plan to harm self.  Patient does not endorse any intention to harm self.  Patient is aware of resources available if he experiences suicidal ideation.      Homicidal Risk Assessment:   None reported  No crisis plan required due to absence of risk.    Patient's impression of their current status:  Somewhat improving.  He notes symptoms were better when he was out of town away from a lot of heavy traffic.    Therapist impression of patient's current status: Improving somewhat, however avoidance is still strong.    Type of psychotherapeutic technique provided:  CBT, motivational  interviewing    Response to psychotherapeutic interventions:  Patient responded to interventions in the following manner: Accepting.    Progress toward short term goals:  Progress as expected: Patient is practicing skills taught in psychotherapy and seeing benefits.    Review of long-term goals:  Not done at today's visit. Date of last review of long term goals is  3/4/19  Goals read as follows: 1. Reduce the amount that my father's behaviors and family stressors upset me from 7-8/10 to 4/10.  2. Decrease the amount that PTSD symptoms bother me from a range of 3/10 to 5/10 down to a range of 0/10 to 3/10.      Diagnosis:   1. PTSD (post-traumatic stress disorder)      Plan and Follow-Up:  Patient will return for follow-up psychotherapy session in one week    Patient will complete the following homework before our next session:  Patient plans to continue working with above-mentioned skills.    Discharge Criteria/ Planning:  Patient will continue with follow-up until therapy can be discontinued without return of symptoms.      Linda Carrillo 5/20/2019

## 2021-05-30 NOTE — PROGRESS NOTES
MENTAL HEALTH VISIT NOTE    Date of Service: 7/15/2019    Start time:3:00 pm  Stop time:3:55 pm  This is session number 10 this calendar year.  The patient was seen for individual psychotherapy    No  was required because the patient speaks and understands English.  Rashid Navarro Jr. is a 58 y.o. male is being seen today for individual psychotherapy to address the following:    Chief Complaint   Patient presents with     PTSD   .     NECESSITY: This individual session was necessary for the care of the patient to address difficulties in psychosocial functioning that are affected by and affect the patient's ability to cope with and heal from spinal conditions and are affected by the patient's mental health diagnosis listed in the diagnosis section below.    New symptoms or complaints: None    Functional Impairment (0-4):   Personal: 3  Family: 3  Work: 2  Social:2    Clinical Assessment of Mental Status  Mood: Sad    Affect:   Congruent with content of speech    Appearance:  well-groomed, casually dressed, engaged    Speech:  Within normal limits    Orientation:   Oriented to person, time, and place    Memory:  No evidence of impairment.    Concentration:  Within normal limits    Focus:   Within normal limits    Fund of knowledge:  adequate    Suicidal Ideation present:   Absent  Suicidal Risk Assessment:  No specific plan to harm self.  Patient does not endorse any intention to harm self.  Patient is aware of resources available if he experiences suicidal ideation.      Homicidal Risk Assessment:   None reported  No crisis plan required due to absence of risk.    Patient's impression of their current status:  Symptoms have worsened lately due to caregiver stress.    Therapist impression of patient's current status: Symptoms are worsening.  Stress at home is increasing.  He continues to encounter situations in which PTSD symptoms are activated.    Type of psychotherapeutic technique provided:  CBT,  motivational interviewing    Response to psychotherapeutic interventions:  Patient responded to interventions in the following manner: Accepting.    Progress toward short term goals:  Progress as expected: Patient is practicing skills taught in psychotherapy and seeing benefits.    Review of long-term goals:  Not done at today's visit. Date of last review of long term goals is  3/4/19  Goals read as follows: 1. Reduce the amount that my father's behaviors and family stressors upset me from 7-8/10 to 4/10.  2. Decrease the amount that PTSD symptoms bother me from a range of 3/10 to 5/10 down to a range of 0/10 to 3/10.    Diagnosis:   1. PTSD (post-traumatic stress disorder)      Plan and Follow-Up:  Patient will return for follow-up psychotherapy session in several weeks with my colleague at the spine center as I am leaving [facility] for private practice and he is not able to continue with me.    Discharge Criteria/ Planning:  Because I am no longer able to see the client, I am considering him discharged from my care.  He has been advised that he can follow-up with another provider at the spine or pain center and that he will also be sent a letter in the next few weeks listing other options.    Linda Carrillo 7/15/2019

## 2021-05-31 NOTE — PROGRESS NOTES
[facility], I have a duty to inform my patients in a timely manner and to offer options for continuing care per the [employer] Code of [employer]. I have known that I will be leaving on August 5, 2019, since June 14, 2019, and I informed the administration of this clinic of this fact at that time. I have attempted on multiple occassions to get approval from the administration of this clinic to send a letter informing patients of this fact and of their options for care which would include both [facility] and [facility] locations. The approval to send the letter did not come until July 31, 2019, and I have been informed that the letter contains only [facility] options. While this does not meet my recommendation that the client be presented with at least three different clinics, one of which would be [facility], as options for continuing care, this is the choice of the organization. The patient will be sent this letter this week.    The specific date that this letter is sent may be discovered by searching for the letter in the patient's medical record.    Because of the delay in sending the letter and the nature of the therapeutic relationship, as well as the fact that it is good clinical practice to process the termination of psychotherapy with the patient, I discussed with this patient in session that I will be leaving and opportunities for continuing their mental health care. I also let this patient know that he will be receiving a letter with options.     The patient informed me that he will be seeking to continue care through [facility].    As of this writing, I am considering the patient discharged from my care at [facility]. At close of treatment, the patient's condition was gradually improving due to good follow-through up until recent months. Treatment goals were partially met.

## 2021-06-01 VITALS — BODY MASS INDEX: 20.89 KG/M2 | HEIGHT: 66 IN | WEIGHT: 130 LBS

## 2021-06-01 VITALS — HEIGHT: 66 IN | WEIGHT: 128 LBS | BODY MASS INDEX: 20.57 KG/M2

## 2021-06-01 VITALS — HEIGHT: 66 IN | WEIGHT: 129 LBS | BODY MASS INDEX: 20.73 KG/M2

## 2021-06-01 VITALS — BODY MASS INDEX: 21.86 KG/M2 | HEIGHT: 66 IN | WEIGHT: 136 LBS

## 2021-06-02 VITALS — HEIGHT: 66 IN | BODY MASS INDEX: 20.73 KG/M2 | WEIGHT: 129 LBS

## 2021-06-02 VITALS — HEIGHT: 66 IN | WEIGHT: 129 LBS | BODY MASS INDEX: 20.73 KG/M2

## 2021-06-04 VITALS
SYSTOLIC BLOOD PRESSURE: 138 MMHG | BODY MASS INDEX: 20.19 KG/M2 | HEART RATE: 60 BPM | OXYGEN SATURATION: 97 % | DIASTOLIC BLOOD PRESSURE: 82 MMHG | WEIGHT: 125.1 LBS

## 2021-06-05 VITALS
SYSTOLIC BLOOD PRESSURE: 192 MMHG | DIASTOLIC BLOOD PRESSURE: 97 MMHG | HEART RATE: 65 BPM | WEIGHT: 131 LBS | BODY MASS INDEX: 21.14 KG/M2 | OXYGEN SATURATION: 99 %

## 2021-06-05 VITALS
WEIGHT: 130 LBS | SYSTOLIC BLOOD PRESSURE: 138 MMHG | DIASTOLIC BLOOD PRESSURE: 98 MMHG | HEART RATE: 64 BPM | BODY MASS INDEX: 20.98 KG/M2

## 2021-06-05 VITALS — HEIGHT: 66 IN | BODY MASS INDEX: 21.05 KG/M2 | WEIGHT: 131 LBS

## 2021-06-10 NOTE — TELEPHONE ENCOUNTER
He never once mentioned COVID-19 testing while he was in the office. I will sign the order. He does not have symptoms.

## 2021-06-10 NOTE — TELEPHONE ENCOUNTER
When giving patient his results, he asked about COVID test. He thought that this would get done. I explained to him that we are testing symptomatic patient's at this time which he is not. 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 He would like to request a test. Order pended if approved.  Inga Nava CMA ............... 1:27 PM, 08/24/20

## 2021-06-10 NOTE — TELEPHONE ENCOUNTER
Left voicemail for patient to return call to clinic. When patient returns call, please give them below message.    Halina placed order for COVID test. Someone should call to help him schedule.     Please call the patient and update him of his lab results. His Vitamin D level was low at 25.9 (greater than 30 is normal). He should start on Vitamin D 1,000 units daily, over the counter.     He was negative for Syphilis, Hep B, Hep C. HIV and chlamydia/gonorrhe negative. His urine was normal. His thyroid level was normal. His prostate level was normal. His cholesterol was normal.     His electrolytes, kidney function, and calcium levels were normal. His liver enzymes were normal, bilirubin was slightly elevated at 1.1 (normal is 0-1.0).     His white count, red count, and hemoglobin were normal. His MCV was elevated at 107 ( is normal), MCH was elevated at 37.5 (normal is 27-34). This tells me that he continues to likely have macrocytic anemia (large size red blood cells) caused from low Folic Acid and B12 levels. He should continue on his B12 and Folic Acid supplements.

## 2021-06-10 NOTE — TELEPHONE ENCOUNTER
Contacted patient and gave him all the lab results.  Patient stated he would like a prescription sent for folic acid, B12, Vitamin D3 1000 iu, and his blood pressure amlodipine.  Use [facility] on [facility] and [facility] in [place].

## 2021-06-10 NOTE — TELEPHONE ENCOUNTER
Left voicemail for patient to return call to clinic. When patient returns call, please give them below message.    Inga Nava CMA ............... 5:09 PM, 08/26/20

## 2021-06-10 NOTE — TELEPHONE ENCOUNTER
----- Message from Halina Rollins CNP sent at 8/27/2020 11:20 AM CDT -----  Please call the patient and let him know he had negative COVID-19 testing.

## 2021-06-12 NOTE — TELEPHONE ENCOUNTER
Refill Approved    Rx renewed per Medication Renewal Policy. Medication was last renewed on 10/16/2018  OV 8/21/2020  Esha Simon, [facility] Triage/Med Refill 10/19/2020     Requested Prescriptions   Pending Prescriptions Disp Refills     aspirin 325 MG EC tablet  0     Sig: Take 1 tablet (325 mg total) by mouth daily.       Aspirin/Dipyridamole Refill Protocol Passed - 10/16/2020  2:45 PM        Passed - PCP or prescribing provider visit in past 12 months       Last office visit with prescriber/PCP: 8/21/2020 Halina Rollins CNP OR same dept: 8/21/2020 Halina Rollins CNP OR same specialty: 8/21/2020 Halina Rollins CNP  Last physical: Visit date not found Last MTM visit: Visit date not found    Next appt within 3 mo: Visit date not found Next physical within 3 mo: Visit date not found  Prescriber OR PCP: Halina Rollins CNP  Last diagnosis associated with med order: 1. Synovial cyst  - aspirin 325 MG EC tablet; Take 1 tablet (325 mg total) by mouth daily.; Refill: 0    If protocol passes may refill for 6 months if within 3 months of last provider visit (or a total of 9 months).

## 2021-06-15 NOTE — TELEPHONE ENCOUNTER
----- Message from Halina Rollins CNP sent at 3/15/2021 12:54 PM CDT -----  Please call the patient and let him know that his chest x-ray showed emphysema (from his smoking) but no acute issues. I did order a complete echocardiogram of his heart to make sure everything looks okay. Scheduling will call him to set this up.    His foot x-ray was normal. I suspect the pain is from neuropathy. It looks like he has tried and failed Gabapentin and Lyrica or Pregabalin. Would he be willing to try Duloxetine or Cymbalta for help with his neuropathic pain?

## 2021-06-15 NOTE — TELEPHONE ENCOUNTER
Message relayed to patient. He is going to have someone look into the two meds and will call back.

## 2021-06-16 PROBLEM — D12.6 ADENOMATOUS POLYP OF COLON: Status: ACTIVE | Noted: 2017-01-05

## 2021-06-16 PROBLEM — G62.1 ALCOHOL-INDUCED POLYNEUROPATHY (H): Status: ACTIVE | Noted: 2017-11-16

## 2021-06-16 PROBLEM — M48.02 FORAMINAL STENOSIS OF CERVICAL REGION: Status: ACTIVE | Noted: 2017-11-16

## 2021-06-16 PROBLEM — G25.81 RLS (RESTLESS LEGS SYNDROME): Status: ACTIVE | Noted: 2017-11-16

## 2021-06-16 PROBLEM — F11.90 OPIOID USE DISORDER: Status: ACTIVE | Noted: 2018-09-26

## 2021-06-16 PROBLEM — I10 ESSENTIAL HYPERTENSION: Status: ACTIVE | Noted: 2020-01-08

## 2021-06-16 PROBLEM — R07.89 CHEST PAIN, NON-CARDIAC: Status: ACTIVE | Noted: 2017-11-16

## 2021-06-16 PROBLEM — F10.90 HEAVY ALCOHOL USE: Status: ACTIVE | Noted: 2017-11-16

## 2021-06-16 PROBLEM — G56.00 CTS (CARPAL TUNNEL SYNDROME): Status: ACTIVE | Noted: 2017-11-16

## 2021-06-16 PROBLEM — F11.10: Status: ACTIVE | Noted: 2018-02-05

## 2021-06-16 PROBLEM — M71.30 SYNOVIAL CYST: Status: ACTIVE | Noted: 2018-10-09

## 2021-06-16 PROBLEM — F10.20 MODERATE ALCOHOL USE DISORDER (H): Status: ACTIVE | Noted: 2021-03-15

## 2021-06-16 PROBLEM — R20.8 DYSESTHESIA: Status: ACTIVE | Noted: 2021-03-15

## 2021-06-16 PROBLEM — R25.2 HAND CRAMPS: Status: ACTIVE | Noted: 2017-11-16

## 2021-06-16 PROBLEM — D53.9 MACROCYTIC ANEMIA: Status: ACTIVE | Noted: 2018-09-26

## 2021-06-16 PROBLEM — R51.9 RECURRENT OCCIPITAL HEADACHE: Status: ACTIVE | Noted: 2017-11-16

## 2021-06-16 NOTE — TELEPHONE ENCOUNTER
----- Message from Halina Rollins CNP sent at 4/1/2021  3:44 PM CDT -----  Please call the patient and let him know that his echocardiogram showed that his left and right ventricle looked okay. No significant valve issues in his heart. Overall his echo looked okay.

## 2021-06-16 NOTE — TELEPHONE ENCOUNTER
Called pt with results. He states that he is still have chest pressure and heaviness. Wants to know what else can be done. Has an appt with you on 4/15.

## 2021-06-16 NOTE — TELEPHONE ENCOUNTER
If he has chest pressure and heaviness right now, he needs to be seen in Urgent Care or the ER for evaluation.

## 2021-06-17 NOTE — PROGRESS NOTES
[facility] Certification Request    May 2, 2018      Patient: Rashid Navarro Jr.  MR Number: [id number]  YOB: 1960  Date of Visit: 5/2/2018      Dear Dr. Galvez    Thank you for this referral.   We are seeing Rashid Navarro for Physical Therapy of his cervical and lumbar radicular symptoms.    Medicare and/or Medicaid requires physician review and approval of the treatment plan. Please review the plan of care and verify that you agree with the therapy plan of care by co-signing this note.      Plan of Care  Authorization / Certification Start Date: 05/02/18  Authorization / Certification End Date: 07/01/18  Communication with: Referral Source;Patient Caregiver  Patient Related Instruction: Nature of Condition;Treatment plan and rationale;Self Care instruction;Basis of treatment;Body mechanics;Posture;Expected outcome  Times per Week: 2  Number of Weeks: 6  Number of Visits: 12 - PRN   Discharge Planning: Pt will be discharged upon meeting goals or plateau of progress   Therapeutic Exercise: ROM;Stretching;Strengthening  Neuromuscular Reeducation: kinesio tape;posture;core  Manual Therapy: soft tissue mobilization;myofascial release;joint mobilization;muscle energy;strain counterstrain  Modalities: traction;electrical stimulation;TENS;cold pack;hot pack    Goals:  Pt. will demonstrate/verbalize independence in self-management of condition in : 6 weeks  Pt. will improve posture : and demonstrate posture with minimal to no cuing;and maintain posture for;5 minutes;in sitting;in standing;in 6 weeks  Patient will demonstrate proper body mechanics : for lifting;for bending;with less pain;with less difficulty;for dependent care;for chores;in 6 weeks  Pt will: be able to report less frequent and less intense left UE radicular sx for increased function; in 6 weeks   Pt will: be able to report less frequent and less intense right LE radciular sx for increased function; in 6 weeks       If you  have any questions or concerns, please don't hesitate to call.    Sincerely,      Chelsea Fuller, PT        Physician recommendation:     ___ Follow therapist's recommendation        ___ Modify therapy      *Physician co-signature indicates they certify the need for these services furnished within this plan and while under their care.        [facility]   Cervical Thoracic and Lumbar Initial Evaluation    Patient Name: Rashid Navarro Jr.  Date of evaluation: 5/2/2018  Referral Diagnosis: Radiculopathy of cervical region  Referring provider: Beverly Galvez*  Visit Diagnosis:     ICD-10-CM    1. Radiculitis of left cervical region M54.12    2. Right lumbar radiculitis M54.16    3. Poor posture R29.3    4. Muscle weakness (generalized) M62.81        Assessment:      Pt. is appropriate for skilled PT intervention as outlined in the Plan of Care (POC).    Goals:  Pt. will demonstrate/verbalize independence in self-management of condition in : 6 weeks  Pt. will improve posture : and demonstrate posture with minimal to no cuing;and maintain posture for;5 minutes;in sitting;in standing;in 6 weeks  Patient will demonstrate proper body mechanics : for lifting;for bending;with less pain;with less difficulty;for dependent care;for chores;in 6 weeks  Pt will: be able to report less frequent and less intense left UE radicular sx for increased function; in 6 weeks   Pt will: be able to report less frequent and less intense right LE radciular sx for increased function; in 6 weeks     Patient's expectations/goals are realistic.    Barriers to Learning or Achieving Goals:  No Barriers.       Plan / Patient Instructions:        Plan of Care:   Authorization / Certification Start Date: 05/02/18  Authorization / Certification End Date: 07/01/18  Communication with: Referral Source;Patient Caregiver  Patient Related Instruction: Nature of Condition;Treatment plan and rationale;Self Care instruction;Basis of  treatment;Body mechanics;Posture;Expected outcome  Times per Week: 2  Number of Weeks: 6  Number of Visits: 12 - PRN   Discharge Planning: Pt will be discharged upon meeting goals or plateau of progress   Therapeutic Exercise: ROM;Stretching;Strengthening  Neuromuscular Reeducation: kinesio tape;posture;core  Manual Therapy: soft tissue mobilization;myofascial release;joint mobilization;muscle energy;strain counterstrain  Modalities: traction;electrical stimulation;TENS;cold pack;hot pack    Plan for next visit: Trial of Saunder's cervical traction - 10 minutes, static pull 15-20#, 2nd notch and add exercises listed in the flow sheet      Subjective:         History of Present Illness:    Rashid is a 57 y.o. male who presents to therapy today with complaints of low back pain with a nerve impingement and sx into the right leg and his neck pain with left UE sx. Date of onset/duration of symptoms is November 2017. Onset was gradual. Symptoms are constant and not improving. He reports  an episodic  history of similar symptoms. He describes their previous level of function as limited with lifting since his previous surgeries     Previous lumbar surgery history in 2005 and 2010.  Pt has a lumbar traction unit that he uses and it helps.  He uses this unit 2-3x per week, but have not been using it lately.  He feels pain in his right buttocks and it runs down to the mid calf     Pt reports that he also has deterioration in his cervical discs and increasing cervical kyphosis.  He does not have neck pain, he has hand cramping and numbness into his fingers in C7.  There is relief with hands over head     No really mechanism of injury.  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    Pt reports that he is out of town for a few weeks at a time and often.  Pt also reports that he tends to be non-compliant with his HEP exercises.      Pain Rating:10  Pain rating at best: 5  Pain rating at worst: 10  Pain  description: aching, numbness and pain    Functional limitations are described as occurring with:   overuse of arm or legs   bending  performing routine daily activities  more intense work tasks     Patient reports benefit from:  lumbar traction unit    Pt uses heat on his back and some heat on his neck         Objective:      Note: Items left blank indicates the item was not performed or not indicated at the time of the evaluation.    Cervical Thoracic Examination  1. Radiculitis of left cervical region     2. Right lumbar radiculitis     3. Poor posture     4. Muscle weakness (generalized)       Involved side: Bilateral  Posture Observation:      General sitting posture is  poor.    Cervical ROM:    Date: 5/2/18     *Indicate scale AROM AROM AROM   Cervical Flexion Min loss, increased L radic to fingers      Cervical Extension Min loss, increased L radic to fingers      Right Left Right Left Right Left   Cervical Sidebending Mod loss, neck stretch, no radic  Mod loss, increased L radic sx        Cervical Rotation No loss, no sx  No loss, no sx        Cervical Protraction No loss, no sx      Cervical Retraction No loss, no sx        Lumbar ROM:    Date: 5/2/18      *Indicate scale AROM AROM AROM   Lumbar Flexion Mod + loss, decrease in R leg sx      Lumbar Extension Mod loss, tight back, right buttocks pinch       Right Left Right Left Right Left   Lumbar Sidebending Mod loss, pinch/p right butt  Min loss, min R butt p       Lumbar Rotation No loss, LBP No loss, R butt p         Strength     Date: 5/2/18     Cervical Myotomes/5 Right Left Right Left Right Left   Cervical Flex/Ext  (C1-2) 4- 4-       Cervical Sidebending (C3) 4- 4-       Shoulder Elevation (C4) 4 4       Shoulder Abduction (C5) 4 4       Elbow Flexion (C6) 4+ 4+       Elbow Extension (C7) 4+ 4+       Wrist Flexion (C7) 5 5       Wrist Extension (C6) 5 5       Thumb abduction (C8) 5 5       Finger Abduction (T1) 5 5         Lower Extremity Strength:      Date: 5/2/18     LE strength/5 Right Left Right Left Right Left   Hip Flexion (L1-3) 4- 4-       Hip Extension (L5-S1) 4- 4-       Hip Abduction (L4-5) 4- 4-       Hip Adduction (L2-3) 4- 4-       Hip External Rotation 4 4       Hip Internal Rotation 4 4       Knee Extension (L3-4) 4+ 4+       Knee Flexion 4+ 4+       Ankle Dorsiflexion (L4-5) 5 5       Great Toe Extension (L5)         Ankle Plantar flexion (S1) 5 5       Abdominals Decreased strength and awareness        Sensation: WNL       Reflex Testing: WNL mostly    Palpation:  Increased tightness and tenderness over cervical paraspainals and UT = moderate   Increased tightness and tenderness over bilateral lumbar muscles = min - mod   Increased tightness and tenderness over the right gluteals = moderate+    Cervical Passive Mobility-Joint Integrity: WNL.  Hypomobile.   Pain over C6, C7, T1  Radicular sx at C6 to left UE     Cervical Special Tests     Cervical Special Tests Right Left UE Nerve Mobility Right Left   Cervical compression - - Median nerve - +   Cervical distraction - - Ulnar nerve - +   Spurling s test - - Radial nerve - +   Shoulder abduction sign   Thoracic outlet     Deep neck flexor endurance test   Andre     Upper cervical rotation   Adson s     Sharper-Nora   Cervical rotation lateral flexion     Alar ligament test   Other:     Other:   Other:       Lumbar Special Tests:     Lumbar Special Tests Right Left SI Tests Right  Left   Quadrant test - - SI Compression - -   Straight leg raise tight - SI Distraction - -   Crossover response Min+ - POSH Test - -   Slump Min + - Sacral Thrust - -   Sit-up test  FADIR - -   Trunk extensor endurance test  Resisted Abduction     Prone instability test  Other:     Pubic shotgun  Other:         Passive Mobility - Joint Integrity:  Hypomobile  Pain over L4, L5, S1    Treatment Today     TREATMENT MINUTES COMMENTS   Evaluation 35    Self-care/ Home management     Manual therapy     Neuromuscular  Re-education     Therapeutic Activity     Therapeutic Exercises 15    Gait training     Modality__________________                Total 50 Pt eval started late due to pt paperwork, etc.    Blank areas are intentional and mean the treatment did not include these items.     PT Evaluation Code: (Please list factors)  Patient History/Comorbidities: None listed   Examination: + radicular sx, cervical and lumbar weakness, decreased ROM, poor posture   Clinical Presentation: stable and uncomplicated   Clinical Decision Making: low    Patient History/  Comorbidities Examination  (body structures and functions, activity limitations, and/or participation restrictions) Clinical Presentation Clinical Decision Making (Complexity)   No documented Comorbidities or personal factors 1-2 Elements Stable and/or uncomplicated Low   1-2 documented comorbidities or personal factor 3 Elements Evolving clinical presentation with changing characteristics Moderate   3-4 documented comorbidities or personal factors 4 or more Unstable and unpredictable High            Chelsea Fuller  5/2/2018  10:37 AM

## 2021-06-17 NOTE — PROGRESS NOTES
Neurosurgery consultation was requested by: the patient himself for evaluation of recurrent back and neck pain  Previous history of lumbar decompression  At L1-L3 in 2011  Pain: presents in the back over the past year - intermittent not positional ache. The neck pain had started few months ago with no triggers  Radicular Pain is present: in the right buttock, lateral aspect of thigh and shin - sharp shooting at times. Positional ache in both arms associated with flexion  Lhermitte sign: denies  Motor complaints: weakness in the right leg  Sensory complaints: paresthesias in both hands at night  Gait and balance issues: limps favoring on his left side  Bowel or bladder issues: denies incontinence or saddle anesthesia  Duration of SX is: chronic  The symptoms are worse with: ROM  The symptoms are better with: rest  Injury: denies  Severity is: moderate  Patient has tried the following conservative measures: Tylenol, NSAIDS, Home traction unit  Padmaja Diaz RN, CNRN

## 2021-06-17 NOTE — PROGRESS NOTES
NEUROSURGERY FOLLOWUP  NOTE    Rashid Navarro Jr. comes today well known to us status post lumbar decompressive laminectomy L1-L3 in 2011  (decompression L12  One in 2011 And Right L23 HLMD 2005 ).     He had persistent trochanteric bursitis postop for which he was sent to rehab.   Good after both surgeries      Since our last visit he has developed pain in the back to the Right of the rib cage and pain down The Right leg all the way down the back of his leg  to mid calf.    Worse with sitting  Better with walking.  Strength ok, heals wearing out of his shoes, (worried he is dragging his foot) .   numbness in the same distribution of pain.    Fell in Nov.  He was seen at [facility] for his chest pain and left arm pain and numbness  in Nov, they ordered an MRI.   when sleeping with his left (sometimes also Right) flexed at the elbow then whole hand (fingers and palm) go numb.  No problem if he keeps it straight.   If he cocks his neck he can get pain down the arm but not below the elbow.     Unrelated new  pinch on R side of neck with burning feeling getting better.         The pts PMH, PSH, ROS, Meds, Allergies, SH, FH are all unchanged and summarized in the pts health history from last visit        PHYSICAL EXAM:   Constitutional: There were no vitals taken for this visit.     Mental Status: A & O in no acute distress.  Affect is appropriate.  Speech is fluent.  Recent and remote memory are intact.  Attention span and concentration are normal.     Cranial Nerves: CN1: grossly intact per patient recall. CN2: No funduscopic exam performed. CN3,4 & 6: Pupillary light response, lateral and vertical gaze normal.  No nystagmus.  Visual fields are full to confrontation. CN5: Intact to touch CN7: No facial weakness, smile, facial symmetry intact. CN8: Intact to spoken voice. CN9&10: Gag reflex, uvula midline, palate rises with phonation. CN11: Shoulder shrug 5/5 intact bilaterally. CN12: Tongue midline and moves freely from  side to side.     Motor: No pronator drift of upper extremity. Normal bulk and tone all muscle groups of upper and lower extremities.  weak, finger abduction weak 5th digit and left triceps weak.    Sensory: Sensation intact bilaterally to light touch. Left index finger numb (PP and LT) compared to R.  More numb than the other left fingers which are also reduced.     Coordination:   Heel/toe/ gait intact.  Ok  tandem gait      Reflexes; supinator, biceps, triceps, knee/ ankle jerk present .  No  hoffmans/   No  babinski/ clonus.    No tenderness at elbow.  Hand goes numb on left with bent elbow.     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        MRI lumbar  Moderate spinal stenosis at L45 with severe R foramenal stenosis which is contributed to by a foramenal disk protrusion;   Severe Right L34 foramenal stenosis with paracentral disk.  Remote laminectomy changes at L12 and L23       EMG  Borderline left ulnar entrapment;  Chronic changes in the left pronator teres.  No arm  denervation     CONSULTATION ASSESSMENT AND PLAN:  Pt has 2 issues.    First I think that the arm pain represents a C7 radiculopathy, clinically but there is multilevel foramenal stenosis and it would be nice to verify that C7 is the only  problem.   I would start with PT an traction and medrol dose pack.    The EMG doesn't help us verify levels,     if he is unable to improve with conservative treatment I would do an TF KELSEY left at C67 to verify levels.       There is spinal canal stenosis  But no myelopathy on exam and I would recommend a fix from the front (artificial disk) if he had to had this surgically repaired.  (due to kyphosis would not do a laminoplasty)  .   We will start with the PT and he will call when he needs to proceed with the injections.    Second  issue is his R leg which I suspect from a combination of the R foramenal stenosis at both L34 and L45.  This may be helped with PT  But we will also order a R L34 injection now to help try to differentiate levels.  He will bring back a disk of his lumbar  MRI and  xrays and we will adjust plan if needed.       I spent more than 45 minutes in this apt, examining the pt, reviewing the scans, reviewing notes from chart, discussing treatment options with risks and benefits and coordinating care. >50 % clinic time was spent in face to face counseling and coordinating care    Keira Galvez      CC:      Dr. Jenny Simpson  [address]

## 2021-06-18 NOTE — PATIENT INSTRUCTIONS - HE
Patient Instructions by Halina Rollins CNP at 8/21/2020 10:50 AM     Author: Halina Rollins CNP Service: -- Author Type: Nurse Practitioner    Filed: 8/21/2020 11:37 AM Encounter Date: 8/21/2020 Status: Addendum    : Halina Rollins CNP (Nurse Practitioner)    Related Notes: Original Note by Halina Rollins CNP (Nurse Practitioner) filed at 8/21/2020 11:36 AM       Your labs are pending at this time, we will call you with the results once they are all back.    Continue your amlodipine and aspirin.    We can talk about the next steps for your issues once your lab work is back.    Continue to cut back on drinking.    I will plan on seeing you back in about 3 months for recheck, before then if anything comes up.  Patient Education     Dysuria with Uncertain Cause (Adult)    The urethra is the tube that allows urine to pass out of the body. In a woman, the urethra is the opening above the vagina. In men, the urethra is the opening on the tip of the penis. Dysuria is the feeling of pain or burning in the urethra when passing urine.  Dysuria can be caused by anything that irritates or inflames the urethra. An infection or chemical irritation can cause this reaction. A bladder infection is the most common cause of dysuria in adults. A urine test can diagnose this. A bladder infection needs antibiotic treatment.  Soaps, lotions, colognes and feminine hygiene products can cause dysuria. So can birth control jellies, creams, and foams. It will go away 1 to 3 days after using these irritants.  Sexually transmitted diseases (STDs) such as chlamydia or gonorrhea can cause dysuria. Your healthcare provider may take a culture sample. Your provider may start you on antibiotic medicine before the culture test returns.  In women who have gone through menopause, dysuria can be from dryness in the lining of the urethra. This can be treated with hormones. Dysuria becomes long-term  (chronic) when it lasts for weeks or months. You may need to see a specialist (urologist) to diagnose and treat chronic dysuria.  Home care  These home care tips may help:    Don't use any chemicals or products that you think may be causing your symptoms.    If you were given a prescription medicine, take as directed. Be sure to take it until it is all used up.    If a culture was taken, don't have sex until you have been told that it is negative. This means you don't have an infection. Then follow your healthcare provider's advice to treat your condition.  If a culture was done and it is positive:    Both you and your sexual partner may need to be treated. This is true even if your partner has no symptoms.    Contact your healthcare provider or go to an urgent care clinic or the public health department to be looked at and treated.    Don't have sex until both you and your partner(s) have finished all antibiotics and your healthcare provider says you are no longer contagious.    Learn about and use safe sex practices. The safest sex is with a partner who has tested negative and only has sex with you. Condoms can prevent STDs from spreading, but they aren't a guarantee.  Follow-up care  Follow up with your healthcare provider, or as advised. If a culture was taken, you may call as directed for the results. If you have an STD, follow up with your provider or the public health department for a complete STD screening, including HIV testing. For more information, contact CDC-INFO at 007-633-8840.  When to seek medical advice  Call your healthcare provider right away if any of these occur:    You aren't better after 3 days of treatment    Fever of 100.4 F (38 C) or higher, or as directed by your healthcare provider    Back or belly pain that gets worse    You can't urinate because of pain    New discharge from the urethra, vagina, or penis    Painful sores on the penis    Rash or joint pain    Painful lumps (lymph nodes)  in the groin    Testicle pain or swelling of the scrotum  Date Last Reviewed: 11/1/2016 2000-2017 [email or web address]. [address] [place] [address], [place] [address]. All rights reserved. This information is not intended as a substitute for professional medical care. Always follow your healthcare professional's instructions.

## 2021-06-18 NOTE — PROGRESS NOTES
[facility] Daily Progress     Patient Name: Rashid Navarro Jr.  Date: 2018  Visit #: 5  PTA visit #:  2  Referring provider: Beverly Galvez*  Visit Diagnosis:     ICD-10-CM    1. Radiculitis of left cervical region M54.12    2. Right lumbar radiculitis M54.16    3. Poor posture R29.3    4. Muscle weakness (generalized) M62.81          Assessment:       Subjective report of relief after the treatment.  Patient is benefitting from skilled physical therapy and is making steady progress toward functional goals.  Patient is appropriate to continue with skilled physical therapy intervention, as indicated by initial plan of care.    Goal Status: On going  Pt. will demonstrate/verbalize independence in self-management of condition in : 6 weeks  Pt. will improve posture : and demonstrate posture with minimal to no cuing;and maintain posture for;5 minutes;in sitting;in standing;in 6 weeks  Patient will demonstrate proper body mechanics : for lifting;for bending;with less pain;with less difficulty;for dependent care;for chores;in 6 weeks  Pt will: be able to report less frequent and less intense left UE radicular sx for increased function; in 6 weeks   Pt will: be able to report less frequent and less intense right LE radciular sx for increased function; in 6 weeks     Plan / Patient Education:     Continue with initial plan of care.  Progress with home program as tolerated.    Review HEP.    Next visit: pt to bring in his traction unit and therapist should go over set-up with the patient  Continue 1-2 more visits and then independence.      Subjective:   .  Pain Ratin    Pt reports that he started a steroid pack on Saturday and he is finishing that soon.  He has been feeling great since then.      Pt reports that he does a few of the exercises each day, but not all of them.      Pt has not picked up his cervical traction unit and he will go and get it today.        Objective:     Pt is unable to  "demonstrate his HEP exercises correctly without moderate cueing.  Cued pt on the importance of consistency with his HEP.      Pt with decreased cervical, core, and postural strength and awareness.  This is being addressed by his HEP when he is compliant.     Pt continues to demonstrate poor sitting and standing posture, has difficulty correcting.       Exercises:  Exercise #1: Seated Nerve glide   Comment #1: Rx5 - cued to do B  Exercise #2: Left Brachial plexus glide   Comment #2: Lx5  Exercise #3: supine piriformis stretch  Comment #3: B x30\"  Exercise #4: scap retraction  Comment #4: 5\"x 10  Exercise #5: supine chin tucks  Comment #5: 5\" x5 in sitting   Exercise #6: bridge  Comment #6: x5 - review   Exercise #7: Ab sets  Comment #7: x5 with 5\"    Treatment Today     TREATMENT MINUTES COMMENTS   Evaluation     Self-care/ Home management     Manual therapy     Neuromuscular Re-education     Therapeutic Activity     Therapeutic Exercises 17 See flow sheet  Verbal review  Encouraged pt to perform the nerve glides more often through out the day (3-4 times a day)   Gait training     Modality__saunders cervical traction________________  Cervical traction using Silva traction unit, static hold on level 2 ' @18-20#   Pt supine 90/90   + set-up x 3 min      Lumbar Traction (in clinic) 10  (13) Lumbar traction - static hold at 60# pt supine with table at 17 deg and open + set-up x3 min          Total 30 Pt was on the phone for the first minutes of his appointment    Blank areas are intentional and mean the treatment did not include these items.       Chelsea Fuller, PT  5/23/2018    "

## 2021-06-18 NOTE — PROGRESS NOTES
"[facility] Daily Progress     Patient Name: Rashid Navarro Jr.  Date: 2018  Visit #: 3  PTA visit #:  1  Referring provider: Beverly Galvez*  Visit Diagnosis:     ICD-10-CM    1. Radiculitis of left cervical region M54.12    2. Right lumbar radiculitis M54.16    3. Poor posture R29.3    4. Muscle weakness (generalized) M62.81          Assessment:   Pt was seen today for his first follow up appointment.  Pt noted decreased left hand sx while on traction today.    Pt with persistent L UE symptoms and right LE sx.      Patient is benefitting from skilled physical therapy and is making steady progress toward functional goals.  Patient is appropriate to continue with skilled physical therapy intervention, as indicated by initial plan of care.    Goal Status: On going  Pt. will demonstrate/verbalize independence in self-management of condition in : 6 weeks  Pt. will improve posture : and demonstrate posture with minimal to no cuing;and maintain posture for;5 minutes;in sitting;in standing;in 6 weeks  Patient will demonstrate proper body mechanics : for lifting;for bending;with less pain;with less difficulty;for dependent care;for chores;in 6 weeks  Pt will: be able to report less frequent and less intense left UE radicular sx for increased function; in 6 weeks   Pt will: be able to report less frequent and less intense right LE radciular sx for increased function; in 6 weeks     Plan / Patient Education:     Continue with initial plan of care.  Progress with home program as tolerated.   Assess cervical traction. If is is helpful, paperwork should be sent to [facility]. Review HEP.    Pt inquiring about doing the lumbar traction unit one time here.      Subjective:   .   Pain Ratin for  L UE  And 10 sciatic nerve.\"    Pt reports that he has been out of town and working and so he is really right leg sx.  He knows it is his sciatic nerve being pinches.  The traction helps some but " "not completely.      Pt reports that his neck and left UE sx continue to be problematic.  His left hand has been feeling numb.  Pt reports that he has been fishing a lot.  Pt reports that the last time he did the cervical traction, it seemed to help relieve his left hand sx somewhat.      Pt reports that he does the nerve glides that he was given a few times per day.  However, has done some of the other exercises but not very much.      Pt was suppose to do a steroid medication but he never picked it up and so it got cancelled and he needs to talk to Dr. Galvez's office.      Pt reports that he will be gone for the weekend.        Objective:     Pt is unable to demonstrate his HEP exercises correctly without moderate cueing.      Pt with decreased cervical, core, and postural strength and awareness.    Pt with poor posture.        Exercises:  Exercise #1: Seated Nerve glide   Comment #1: Rx5 - cued to do B  Exercise #2: Left Brachial plexus glide   Comment #2: Lx5  Exercise #3: supine piriformis stretch  Comment #3: B x30\"  Exercise #4: scap retraction  Comment #4: 5\"x 10  Exercise #5: supine chin tucks  Comment #5: 5\" x5 in sitting   Exercise #6: bridge  Comment #6: x5 - review   Exercise #7: Ab sets  Comment #7: x5 with 5\"    Treatment Today     TREATMENT MINUTES COMMENTS   Evaluation     Self-care/ Home management     Manual therapy     Neuromuscular Re-education     Therapeutic Activity     Therapeutic Exercises 16 See flow sheet  Added to HEP:   -supine chin tuck  -scap retraction   -supine piriformis stretch  -bridge   Gait training     Modality__saunders cervical traction________________ 13 Cervical traction using Silva traction unit, static hold on level 2 ' @18-20#   Pt supine 90/90   + set-up x 3 min                 Total 29    Blank areas are intentional and mean the treatment did not include these items.       Chelsea Fuller, PT   5/16/2018      "

## 2021-06-18 NOTE — PATIENT INSTRUCTIONS - HE
Patient Instructions by Halina Rollins CNP at 4/15/2021 11:20 AM     Author: Halina Rollins CNP Service: -- Author Type: Nurse Practitioner    Filed: 4/15/2021 11:34 AM Encounter Date: 4/15/2021 Status: Addendum    : Halina Rollins CNP (Nurse Practitioner)    Related Notes: Original Note by Halina Rollins CNP (Nurse Practitioner) filed at 4/15/2021 11:34 AM       You need to cut back on your drinking and smoking.    If you would like to go to rehab for alcohol treatment please let me know.    Try over the counter Melatonin for sleeping. Melatonin 1-3 mg is a good place to start with dosing.     Continue to take your blood pressure one hour after your medications, record your readings, and I will see you back in a month for follow up.      Patient Education     Alcohol Addiction  Does your drinking harm yourself or others? Or has it led to other problems with your daily life? If so, you may be addicted to alcohol.  You may have what's called an alcohol use disorder. Your healthcare provider may make this diagnosis if you have had at least 2 of these problems in a year:    You drink alcohol in larger amounts or for a longer period than you planned.    You often want to cut down or control how much you drink. Or you have often failed to do so.    You spend a lot of time getting alcohol, using it, or recovering from its use.    You crave or have a strong desire or urge to drink.    Your drinking makes it hard for you to be responsible at work, school, or home.    You keep on drinking even though you have had problems in relationships or social settings because of it.    You give up or miss important social, work, or other activities because of your drinking.    You drink alcohol at times when it's not physically safe, such as drinking then driving.    You keep on drinking even though you know it has caused physical or emotional problems.    You need more and more alcohol  to get the same effects.    You hide how much you drink from family and friends.    You have withdrawal symptoms or use alcohol to avoid such symptoms.  Date Last Reviewed: 2/1/2017 2000-2019 [employer]. [address] [place] [address], [place] [address]. All rights reserved. This information is not intended as a substitute for professional medical care. Always follow your healthcare professional's instructions.           Patient Education     Eating Heart-Healthy Foods  Eating has a big impact on your heart health. In fact, eating healthier can improve several of your heart risks at once. For instance, it helps you manage weight, cholesterol, and blood pressure. Here are ideas to help you make heart-healthy changes without giving up all the foods and flavors you love.  Getting started    Talk with your healthcare provider about eating plans, such as the DASH or Mediterranean diet. You may also be referred to a dietitian.    Change a few things at a time. Give yourself time to get used to a few eating changes before adding more.    Work to create a tasty, healthy eating plan that you can stick to for the rest of your life.    Goals for healthy eating  Below are some tips to improve your eating habits:    Limit saturated fats and trans fats. Saturated fats raise your levels of cholesterol, so keep these fats to a minimum. They are found in foods such as fatty meats, whole milk, cheese, and palm and coconut oils. Avoid trans fats because they lower good cholesterol as well as raise bad cholesterol. Trans fats are most often found in processed foods.    Reduce sodium (salt) intake. Eating too much salt may increase your blood pressure. Limit your sodium intake to 2,300 milligrams (mg) per day (the amount in 1 teaspoon of salt), or less if your healthcare provider recommends it. Dining out less often and eating fewer processed foods are two great ways to decrease the amount of salt you consume.    Managing  calories. A calorie is a unit of energy. Your body burns calories for fuel, but if you eat more calories than your body burns, the extras are stored as fat. Your healthcare provider can help you create a diet plan to manage your calories. This will likely include eating healthier foods as well as exercising regularly. To help you track your progress, keep a diary to record what you eat and how often you exercise.  Choose the right foods  Aim to make these foods staples of your diet. If you have diabetes, you may have different recommendations than what is listed here:    Fruits and vegetables provide plenty of nutrients without a lot of calories. At meals, fill half your plate with these foods. Split the other half of your plate between whole grains and lean protein.    Whole grains are high in fiber and rich in vitamins and nutrients. Good choices include whole-wheat bread, pasta, and brown rice.    Lean proteins give you nutrition with less fat. Good choices include fish, skinless chicken, and beans.    Low-fat or nonfat dairy provides nutrients without a lot of fat. Try low-fat or nonfat milk, cheese, or yogurt.    Healthy fats can be good for you in small amounts. These are unsaturated fats, such as olive oil, nuts, and fish. Try to have at least 2 servings per week of fatty fish, such as salmon, sardines, mackerel, rainbow trout, and albacore tuna. These contain omega-3 fatty acids, which are good for your heart. Flaxseed is another source of a heart-healthy fat.  More on heart-healthy eating  Read food labels  Healthy eating starts at the grocery store. Be sure to pay attention to food labels on packaged foods. Look for products that are high in fiber and protein, and low in saturated fat, cholesterol, and sodium. Avoid products that contain trans fat. And pay close attention to serving size. For instance, if you plan to eat two servings, double all the numbers on the label.  Prepare food right  A key part of  healthy cooking is cutting down on added fat and salt. Look on the internet for lower-fat, lower-sodium recipes. Also, try these tips:    Remove fat from meat and skin from poultry before cooking.    Skim fat from the surface of soups and sauces.    Broil, boil, bake, steam, grill, and microwave food without added fats.    Choose ingredients that spice up your food without adding calories, fat, or sodium. Try these items: horseradish, hot sauce, lemon, mustard, nonfat salad dressings, and vinegar. For salt-free herbs and spices, try basil, cilantro, cinnamon, pepper, and rosemary.  Date Last Reviewed: 10/1/2017    6411-4873 [employer]. [address] [place] [address], [place] [address]. All rights reserved. This information is not intended as a substitute for professional medical care. Always follow your healthcare professional's instructions.

## 2021-06-18 NOTE — PATIENT INSTRUCTIONS - HE
Patient Instructions by Halina Rollins CNP at 3/15/2021 11:40 AM     Author: Halina Rollins CNP Service: -- Author Type: Nurse Practitioner    Filed: 3/15/2021 12:15 PM Encounter Date: 3/15/2021 Status: Addendum    : Halina Rollins CNP (Nurse Practitioner)    Related Notes: Original Note by Halina Rollins CNP (Nurse Practitioner) filed at 3/15/2021 12:14 PM       Start checking your blood pressure on a daily basis.  Record these readings.  Update provider if running over 140/90.    Start the tamsulosin at bedtime tonight.  This should help with your urine stream and your blood pressure.    Your x-rays are processing at this time, we will update you with results once they are back.    For the tooth take the Clindamycin as prescribed, follow-up with your dentist.    Please try to cut back on smoking and drinking.  Goals for drinking are 0 beverages per week.    I will see you back in a month for recheck, before then if anything comes up.    ER if having worsening chest pain, shortness of breath.     Patient Education     Noncardiac Chest Pain    Based on your visit today, the healthcare provider doesnt know what is causing your chest pain. In most cases, people who come to the emergency department with chest pain dont have a problem with their heart. Instead, the pain is caused by other conditions. It's important for the healthcare team to be sure you are not having a life threatening cause for chest pain such as a heart attack, blood clot in the lungs, collapsed lung, ruptured esophagus, or tearing of the aorta. Once these major causes have been ruled out, you may have further evaluation for non-heart causes of chest pain. These may be problems with the lungs, muscles, bones, digestive tract, nerves, or mental health.  Lung problems    Inflammation around the lungs (pleurisy)    Collapsed lung (pneumothorax)    Fluid around the lungs (pleural effusion)    Lung cancer  (a rare cause of chest pain)  Muscle or bone problems    Inflamed cartilage between the ribs (costochondritis)    Fibromyalgia    Rheumatoid arthritis    Chest wall strain  Digestive system problems    Reflux    Stomach ulcer    Spasms of the esophagus    Gall stones    Gallbladder inflammation  Mental health conditions    Panic or anxiety attacks    Emotional distress  Your condition doesnt seem serious and your pain doesnt appear to be coming from your heart. But sometimes the signs of a serious problem take more time to appear. Watch for the warning signs listed below.  Home care  Follow these guidelines when caring for yourself at home:    Rest today and avoid strenuous activity.    Take any prescribed medicine as directed.  Follow-up care  Follow up with your healthcare provider, or as advised, if you dont start to feel better within 24 hours.  When to seek medical advice  Call your healthcare provider right away if any of these occur:    A change in the type of pain. Call if it feels different, becomes more serious, lasts longer, or begins to spread into your shoulder, arm, neck, jaw, or back.    Shortness of breath    You feel more pain when you breathe    Cough with dark-colored mucus or blood    Weakness, dizziness, or fainting    Fever of 100.4 F (38 C) or higher, or as directed by your healthcare provider    Swelling, pain, or redness in one leg  Date Last Reviewed: 12/1/2016 2000-2017 [employer]. [address] [place] [address], [place] [address]. All rights reserved. This information is not intended as a substitute for professional medical care. Always follow your healthcare professional's instructions.

## 2021-06-18 NOTE — PROGRESS NOTES
[facility] Discharge Summary    Patient Name: Rashid Navarro Jr.  Date: 6/26/2018  Referring provider: Beverly Galvez*  Visit Diagnosis:   1. Radiculitis of left cervical region     2. Right lumbar radiculitis     3. Poor posture     4. Muscle weakness (generalized)         Goals:  Pt. will demonstrate/verbalize independence in self-management of condition in : 6 weeks  Pt. will improve posture : and demonstrate posture with minimal to no cuing;and maintain posture for;5 minutes;in sitting;in standing;in 6 weeks  Patient will demonstrate proper body mechanics : for lifting;for bending;with less pain;with less difficulty;for dependent care;for chores;in 6 weeks  Pt will: be able to report less frequent and less intense left UE radicular sx for increased function; in 6 weeks   Pt will: be able to report less frequent and less intense right LE radciular sx for increased function; in 6 weeks     Patient was seen for 5 visits with the last visit on 5/23/18  Patient was given a home traction unit and was feeling better overall with less sx and independent sx management.      Therapy will be discontinued at this time.  The patient will need a new referral to resume.    Thank you for your referral.  Chelsea Fuller  6/26/2018  8:45 AM

## 2021-06-18 NOTE — PROGRESS NOTES
"[facility] Daily Progress     Patient Name: Rashid Navarro Jr.  Date: 2018  Visit #: 4  PTA visit #:  2  Referring provider: Beverly Galvez*  Visit Diagnosis:     ICD-10-CM    1. Radiculitis of left cervical region M54.12    2. Right lumbar radiculitis M54.16    3. Poor posture R29.3    4. Muscle weakness (generalized) M62.81          Assessment:       Subjective report of relief after the treatment.  Patient is benefitting from skilled physical therapy and is making steady progress toward functional goals.  Patient is appropriate to continue with skilled physical therapy intervention, as indicated by initial plan of care.    Goal Status: On going  Pt. will demonstrate/verbalize independence in self-management of condition in : 6 weeks  Pt. will improve posture : and demonstrate posture with minimal to no cuing;and maintain posture for;5 minutes;in sitting;in standing;in 6 weeks  Patient will demonstrate proper body mechanics : for lifting;for bending;with less pain;with less difficulty;for dependent care;for chores;in 6 weeks  Pt will: be able to report less frequent and less intense left UE radicular sx for increased function; in 6 weeks   Pt will: be able to report less frequent and less intense right LE radciular sx for increased function; in 6 weeks     Plan / Patient Education:     Continue with initial plan of care.  Progress with home program as tolerated.    Review HEP.    Pt inquiring about doing the lumbar traction unit one time here.  Pt encouraged to do his nerve glides more often during the day.  Pt will  the steriod medication today.    Will fax paper work for home traction unit today.  Subjective:   .Pt reports he continues to get temporary relief from the cervical traction. Pt continues to use his lumbar traction at home.   Pt reports his L UE is \"ok today.\"   Pt is doing his exercises \"when I can.\"  Pain Ratin for  L UE  And 10 sciatic nerve.\"      Objective: " "    Pt is unable to demonstrate his HEP exercises correctly without moderate cueing.      Pt with decreased cervical, core, and postural strength and awareness.    Pt continues to demonstrate poor sitting and standing posture, has difficulty correcting.       Exercises:  Exercise #1: Seated Nerve glide   Comment #1: Rx5 - cued to do B  Exercise #2: Left Brachial plexus glide   Comment #2: Lx5  Exercise #3: supine piriformis stretch  Comment #3: B x30\"  Exercise #4: scap retraction  Comment #4: 5\"x 10  Exercise #5: supine chin tucks  Comment #5: 5\" x5 in sitting   Exercise #6: bridge  Comment #6: x5 - review   Exercise #7: Ab sets  Comment #7: x5 with 5\"    Treatment Today     TREATMENT MINUTES COMMENTS   Evaluation     Self-care/ Home management     Manual therapy     Neuromuscular Re-education     Therapeutic Activity     Therapeutic Exercises 15 See flow sheet  Verbal review  Encouraged pt to perform the nerve glides more often through out the day (3-4 times a day)   Gait training     Modality__saunders cervical traction________________ 13 Cervical traction using Silva traction unit, static hold on level 2 ' @18-20#   Pt supine 90/90   + set-up x 3 min                 Total 28    Blank areas are intentional and mean the treatment did not include these items.       Nhung James, PTA,CLT  5/18/2018      "
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
## 2021-06-20 NOTE — PROGRESS NOTES
"Rashid Navarro was last seen on 9/4/2018 for his Right anterior and posterior thigh, groin and calf pain associated with numbness and weakness caused by stenosis at L3-4 and L4-5.  Today he returns with an updated lumbar MRI scan and Flex/Ext xrays. \"My waylon  Is worse now, I barely can walk\". He reports a persistent loose stool. Limps favoring on his left side.%.  RIZWAN score is 74%.  Padmaja Diaz RN, CNRN    "

## 2021-06-20 NOTE — ANESTHESIA CARE TRANSFER NOTE
Last vitals:   Vitals:    10/09/18 1559   BP: 139/84   Pulse: 84   Resp: 24   Temp: 36.3  C (97.4  F)   SpO2: 100%     Patient's level of consciousness is awake  Spontaneous respirations: yes  Maintains airway independently: yes  Dentition unchanged: yes  Oropharynx: oropharynx clear of all foreign objects    QCDR Measures:  ASA# 20 - Surgical Safety Checklist: WHO surgical safety checklist completed prior to induction  PQRS# 430 - Adult PONV Prevention: 4558F - Pt received => 2 anti-emetic agents (different classes) preop & intraop  ASA# 8 - Peds PONV Prevention: NA - Not pediatric patient, not GA or 2 or more risk factors NOT present  PQRS# 424 - Shira-op Temp Management: 4559F - At least one body temp DOCUMENTED => 35.5C or 95.9F within required timeframe  PQRS# 426 - PACU Transfer Protocol: - Transfer of care checklist used  ASA# 14 - Acute Post-op Pain: ASA14B - Patient did NOT experience pain >= 7 out of 10
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
## 2021-06-20 NOTE — PROGRESS NOTES
Preop Assessment: Rashid Navarro Jr. presents for pre-op review.  Surgeon: Dr. Galvez  Name of Surgery: Right L3-4 and L4-5 HLMD  Diagnosis: radiculopathy  Date of Surgery: 10/9/2018  Time of Surgery: 1230  Hospital: [facility]  H&P: by Dr. Simpson on 9/26/2018 - cleared for surgery  History of ASA, NSAIDS, vitamin and/or herbal supplements within 10 days: Yes - stopped ASA as of 10/5/2018  History of blood thinners: No  History of anti-seizure med's: No  Review of systems: unchanged    Diagnostics:  Labs: PFT - pending  CXR: n/a  EKG: n/a  Other:   Films: MRI from 9/6/2018 - in [facility]      Last BM - 10/3/2018  Nausea or Vomiting - denies  Urinary retention - denies  Pain management - no Red Flags  Home PT Evaluation: ambulates independently, steady gait and stride, no imbalance noted  - no indication for Home PT pre-op  Patient confirmed they have help/assistance in place at home upon discharge      All questions answered regarding surgery and expected pre and postoperative course including rehabilitation phase.     Reviewed with patient: Arrive 2.5 -3 hours prior to scheduled surgery, nothing to eat or drink after midnight the night before surgery and bring all pertinent films to the hospital the day of surgery.  Continue to refrain from NSAIDS (Ibuprofen, Aleve, Naprosyn) ASA or over the counter herbal medication or supplements, anticoagulants and blood thinners.    Preop skin preparations and instructions provided.    Incentive Spirometer usage instructions provided.    Patient confirmed they have help/assistance in place at home upon discharge.    Patient was informed that we will provide up to 1 week prescription of pain medication for post-operative pain.    Surgical consent was singed.    Padmaja Diaz, RN, CNRN

## 2021-06-20 NOTE — PROGRESS NOTES
"Rashid Navarro was last seen on 4/17/2018 for right thoracic and right leg pain as well as Right C7 radiculopathy.  [facility] Ley and has had few PT sessions (declined KELSEY).  Today he returns in follow up. He presents with a chief complaint of persistent worsening back and right leg/groin/gluteal pain associated with weakness in the leg. Denies sensory disturbance in LE. States hi neck and arm feel \"good\". No bowel or bladder issues. Limps favoring on his left side.  RIZWAN score is 76%  Padmaja Diaz RN, CNRN    "

## 2021-06-20 NOTE — ANESTHESIA POSTPROCEDURE EVALUATION
Patient: Rashid Navarro .  RIGHT LUMBAR 3-4, LUMBAR 4-5 HEMILAMINECTOMY, FORAMENOTOMY WITH SYNOVIAL CYST REMOVAL, MICRODISCECTOMY LUMBAR 4-5  Anesthesia type: general    Patient location: PACU  Last vitals:   Vitals:    10/09/18 1645   BP: 142/77   Pulse: 69   Resp: (!) 30   Temp:    SpO2: 95%     Post vital signs: stable  Level of consciousness: awake and responds to simple questions  Post-anesthesia pain: pain controlled  Post-anesthesia nausea and vomiting: no  Pulmonary: unassisted, return to baseline  Cardiovascular: stable and blood pressure at baseline  Hydration: adequate  Anesthetic events: no    QCDR Measures:  ASA# 11 - Shira-op Cardiac Arrest: ASA11B - Patient did NOT experience unanticipated cardiac arrest  ASA# 12 - Shira-op Mortality Rate: ASA12B - Patient did NOT die  ASA# 13 - PACU Re-Intubation Rate: ASA13B - Patient did NOT require a new airway mgmt  ASA# 10 - Composite Anes Safety: ASA10A - No serious adverse event    Additional Notes:
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
## 2021-06-20 NOTE — ANESTHESIA PREPROCEDURE EVALUATION
Anesthesia Evaluation      Patient summary reviewed   No history of anesthetic complications     Airway   Mallampati: II  Neck ROM: full   Pulmonary - normal exam   (+) a smoker                         Cardiovascular - normal exam  (+) hypertension, ,     ECG reviewed        Neuro/Psych    (+) neuromuscular disease,      Endo/Other       GI/Hepatic/Renal       Other findings: Chem dep  EtOH      Dental    (+) poor dentition                       Anesthesia Plan  Planned anesthetic: general endotracheal    ASA 2   Induction: intravenous   Anesthetic plan and risks discussed with: patient  Anesthesia plan special considerations: antiemetics,   Post-op plan: routine recovery
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
## 2021-06-21 NOTE — PROGRESS NOTES
MENTAL HEALTH VISIT NOTE    Date of Service: 11/12/2018    Start time:1:00 pm  Stop time:1:55 pm  This is session number 3 this calendar year.  The patient was seen for individual psychotherapy    No  was required because the patient speaks and understands English.  Rashid Navarro Jr. is a 58 y.o. male is being seen today for individual psychotherapy to address the following:  No chief complaint on file.  .     NECESSITY: This individual session was necessary for the care of the patient to address difficulties in psychosocial functioning that are affected by and affect the patient's ability to cope with and heal from spinal conditions and are affected by the patient's mental health diagnosis listed in the diagnosis section below.    New symptoms or complaints: None    Functional Impairment (0-4):   Personal: 3  Family: 3  Work: 2  Social:3    Clinical Assessment of Mental Status  Mood: Anxious    Affect:   Congruent with content of speech    Appearance:  casually-dressed,  and engaged,    Speech:  Within normal limits    Orientation:   Oriented to person, time, and place    Memory:  No evidence of impairment.    Concentration:  Within normal limits    Focus:   Within normal limits    Fund of knowledge:  adequate    Suicidal Ideation present:   Absent  Suicidal Risk Assessment:  No specific plan to harm self.  Patient does not endorse any intention to harm self.  Patient is aware of resources available if he experiences suicidal ideation.      Homicidal Risk Assessment:   None reported  No crisis plan required due to absence of risk.    Patient's impression of their current status:  Overall symptoms are slightly improved.    Therapist impression of patient's current status: Continuing to improve.    Type of psychotherapeutic technique provided:  EMDR resourcing and targeting sequence    Response to psychotherapeutic interventions:  Patient responded to interventions in the following manner: Accepting.  He  was able to hold a calm place.    Progress toward short term goals:  Progress as expected: Patient is practicing skills taught in psychotherapy and seeing benefits.    Review of long-term goals:  Not done at today's visit. Date of last review of long term goals is  11/6/18  Goal reads: 1. Decrease the amount that PTSD symptoms bother me from a range of 3/10 to 10/10 down to a range of 0/10 to 3/10.    Diagnosis:   1. PTSD (post-traumatic stress disorder)      Plan and Follow-Up:  Patient will return for follow-up psychotherapy session in one week    Patient will complete the following homework before our next session:  Patient plans to continue working with above-mentioned skills.    Discharge Criteria/ Planning:  Patient will continue with follow-up until therapy can be discontinued without return of symptoms.      Linda Carrillo 11/12/2018
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
## 2021-06-21 NOTE — PROGRESS NOTES
"Rashid Navarro is status post Removal of Right L45 synovial cyst with decompressive lumbar laminectomy, medial facetectomy, foramenotomy and microdiskectomy on 10/9/2018 by Dr. Galvez.  Preoperatively presented with right leg pain.  Today he returns in follow up for sutures removal, He is doing reasonably well - his leg pain is gone, his back pain is 4/10 and associated with laying on his back. Refused to go to Pain Clinic as he would like \"to try without narcotics\". Takes Tylenol. Gait and balance are normal.    Surgical wound WNL - CDI, no signs of infection or skin breakdown.  Incision well-healed: good skin approximation, no redness or visible/palpable edema, no tenderness to palpation.  PT. AF, denies fever, chills or sweats.  Pt. reports that the symptoms are improved from pre-op.    Suture removal - sutures intact removed without difficulty. Wound prepped with Betadine before and after removal.  Surrounding skin has no signs of breakdown.  Verbal instructions regarding incision care are given.  Pt. advised to call us if any s/s of infection noted - all discussed in details.  Padmaja Diaz, RN, CNRN    "

## 2021-06-21 NOTE — PROGRESS NOTES
[facility] Treatment Plan      Name:  Rashid Navarro Jr.  :  1960  MRN:  [id number]    Treatment Plan:  Initial Treatment Plan  Intake/initial treatment plan date:  2018  Benefit and risks and alternatives have been discussed: Yes  Is this treatment appropriate with minimal intrusion/restrictions: Yes  Estimated duration of treatment:  Approximately 20 sessions.  Anticipated frequency of services:  Every 1 weeks  Necessity for frequency: This frequency is needed to establish therapeutic goals and for continuity of care in order to monitor progress.  Necessity for treatment: To address cognitive, behavioral, and/or emotional barriers in order to work toward goals and to improve quality of life.      Plan:   1. Problem:          ?   ?PTSD    Goal:  Decrease the amount that PTSD symptoms bother me from a range of 3/10 to 10/10 down to a range of 0/10 to 3/10.   Strategies: ? [x]Learn and practice relaxation techniques and other coping        strategies (e.g., thought stopping, reframing, meditation)     ? [x] Increase involvement in meaningful activities     ? [x] Discuss sleep hygiene     ? [x] Explore thoughts and expectations about self and others     ? [x] Identify and monitor triggers for panic/anxiety symptoms     ? [x] Implement physical activity routine (with physician approval)     ? [x] Consider introduction of bibliotherapy and/or videos     ? [x] Continue compliance with medical treatment plan (or explore          barriers)                                       [x] EMDR or Prolonged Exposure     Degree to which this is a problem (1-4): 4  Degree to which goal is met (1-4)0  Date goal was initiated: 2018    Functional Impairment: 1=Not at all/Rarely  2=Some days  3=Most Days  4=Every Day    Personal: 4  Family: 3  Social: 3  Work: 4    Diagnosis:    PTSD (post-traumatic stress disorder)        Strengths: ability for insight, average or above intelligence, capable of  independent living, general fund of knowledge, motivation for treatment/growth   Limitations: The patient has the following limitations: , long-standing untreated mental health symptoms  and inability to work in chosen profession due to physical limitations    Cultural Considerations: Gnosticist: not applicable , general cultural considerations: none; racial/ethnic background:       Persons responsible for this plan:  ? [x] Patient ? [x] Provider ? [] Other: __________________      Provider: Performed and documented by JUANPABLO Chávez    11/6/2018    Date:  11/6/2018  Time:  3:08 PM        Patient Signature:____________________________________ Date: ______________         Therapist Signature: __________________________________ Date: ______________
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
## 2021-06-23 NOTE — PROGRESS NOTES
MENTAL HEALTH VISIT NOTE    Date of Service: 1/14/2019    Start time:2:00 pm  Stop time:2:55 pm  This is session number 2 this calendar year.  The patient was seen for individual psychotherapy    No  was required because the patient speaks and understands English.  Rashid Navarro Jr. is a 58 y.o. male is being seen today for individual psychotherapy to address the following:    Chief Complaint   Patient presents with     PTSD   .     NECESSITY: This individual session was necessary for the care of the patient to address difficulties in psychosocial functioning that are affected by and affect the patient's ability to cope with and heal from spinal conditions and are affected by the patient's mental health diagnosis listed in the diagnosis section below.    New symptoms or complaints: None    Functional Impairment (0-4):   Personal: 3  Family: 3  Work:2  Social:2    Clinical Assessment of Mental Status  Mood: Euthymic    Affect:   Congruent with content of speech    Appearance:  well-groomed, casually dressed, engaged    Speech:  Within normal limits    Orientation:   Oriented to person, time, and place    Memory:  No evidence of impairment.    Concentration:  Within normal limits    Focus:   Within normal limits    Fund of knowledge:  adequate    Suicidal Ideation present:   Absent  Suicidal Risk Assessment:  No specific plan to harm self.  Patient does not endorse any intention to harm self.  Patient is aware of resources available if he experiences suicidal ideation.      Homicidal Risk Assessment:   None reported  No crisis plan required due to absence of risk.    Patient's impression of their current status:  Overall symptoms are improving.  His biggest stressor right now is his parents especially his father and his father's reluctance to seek care for his declining mental state.    Therapist impression of patient's current status: Overall symptoms continue to improve. He maintains avoidance of his  emotions.    Type of psychotherapeutic technique provided:  CBT, motivational interviewing    Response to psychotherapeutic interventions:  Patient responded to interventions in the following manner: Accepting. He struggled to articulate a goal related to his caregiver responsibilities that were not based on someone else's behavior changing.    Progress toward short term goals:  Progress as expected: Patient is practicing skills taught in psychotherapy and seeing benefits.    Review of long-term goals:  Not done at today's visit. Not done at today's visit. Date of last review of long term goals is  11/6/18  Goal reads: 1. Decrease the amount that PTSD symptoms bother me from a range of 3/10 to 10/10 down to a range of 0/10 to 3/10.    Diagnosis:   1. PTSD (post-traumatic stress disorder)      Plan and Follow-Up:  Patient will return for follow-up psychotherapy session in one week    Patient will complete the following homework before our next session:  Patient plans to continue working with above-mentioned skills.    Discharge Criteria/ Planning:  Patient will continue with follow-up until therapy can be discontinued without return of symptoms.      Linda Carrillo 1/14/2019

## 2021-06-23 NOTE — PROGRESS NOTES
MENTAL HEALTH VISIT NOTE    Date of Service: 1/21/2019    Start time:2:00 pm  Stop time:2:58 pm  This is session number 3 this calendar year.  The patient was seen for individual psychotherapy    No  was required because the patient speaks and understands English.  Rashid Navarro Jr. is a 58 y.o. male is being seen today for individual psychotherapy to address the following:    Chief Complaint   Patient presents with     PTSD   .     NECESSITY: This individual session was necessary for the care of the patient to address difficulties in psychosocial functioning that are affected by and affect the patient's ability to cope with and heal from spinal conditions and are affected by the patient's mental health diagnosis listed in the diagnosis section below.    New symptoms or complaints: None    Functional Impairment (0-4):   Personal: 2  Family: 3  Work: 2  Social:2    Clinical Assessment of Mental Status  Mood: Euthymic    Affect:   Congruent with content of speech    Appearance:  well-groomed, casually dressed, engaged    Speech:  Within normal limits    Orientation:   Oriented to person, time, and place    Memory:  No evidence of impairment.    Concentration:  Within normal limits    Focus:   Within normal limits    Fund of knowledge:  adequate    Suicidal Ideation present:   Absent  Suicidal Risk Assessment:  No specific plan to harm self.  Patient does not endorse any intention to harm self.  Patient is aware of resources available if he experiences suicidal ideation.      Homicidal Risk Assessment:   None reported  No crisis plan required due to absence of risk.    Patient's impression of their current status:  Symptoms are improving somewhat.  He notes that the problems with his family are the most stressful thing right now, less so than the PTSD symptoms.    Therapist impression of patient's current status: Symptoms are improving somewhat.  He notes that he is able to have good boundaries with his  father if and when it is difficult.  He is noticing some strong emotions about his father's condition.    Type of psychotherapeutic technique provided:  CBT, motivational interviewing, identification of emotions related to caregiving and 2 involuntary services for his father    Response to psychotherapeutic interventions:  Patient responded to interventions in the following manner: Accepting.    Progress toward short term goals:  Progress as expected: Patient is practicing skills taught in psychotherapy and seeing benefits.    Review of long-term goals:  Not done at today's visit. Not done at today's visit. Date of last review of long term goals is  11/6/18  Goal reads: 1. Decrease the amount that PTSD symptoms bother me from a range of 3/10 to 10/10 down to a range of 0/10 to 3/10.    Diagnosis:   1. PTSD (post-traumatic stress disorder)      Plan and Follow-Up:  Patient will return for follow-up psychotherapy session in one week    Patient will complete the following homework before our next session:  Patient plans to continue working with above-mentioned skills.    Discharge Criteria/ Planning:  Patient will continue with follow-up until therapy can be discontinued without return of symptoms.      Linda Carrillo 1/21/2019

## 2021-06-23 NOTE — PROGRESS NOTES
MENTAL HEALTH VISIT NOTE    Date of Service: 2/4/2019    Start time:2:02 pm  Stop time:2:57 pm  This is session number 4 this calendar year.  The patient was seen for individual psychotherapy    No  was required because the patient speaks and understands English.  Rashid Navarro Jr. is a 58 y.o. male is being seen today for individual psychotherapy to address the following:    Chief Complaint   Patient presents with     PTSD   .     NECESSITY: This individual session was necessary for the care of the patient to address difficulties in psychosocial functioning that are affected by and affect the patient's ability to cope with and heal from spinal conditions and are affected by the patient's mental health diagnosis listed in the diagnosis section below.    New symptoms or complaints: None    Functional Impairment (0-4):   Personal: 2  Family: 3  Work: 2  Social:3    Clinical Assessment of Mental Status  Mood: Euthymic    Affect:   Congruent with content of speech    Appearance:  well-groomed, casually dressed, engaged    Speech:  Within normal limits    Orientation:   Oriented to person, time, and place    Memory:  No evidence of impairment.    Concentration:  Within normal limits    Focus:   Within normal limits    Fund of knowledge:  adequate    Suicidal Ideation present:   Absent  Suicidal Risk Assessment:  No specific plan to harm self.  Patient does not endorse any intention to harm self.  Patient is aware of resources available if he experiences suicidal ideation.      Homicidal Risk Assessment:   None reported  No crisis plan required due to absence of risk.    Patient's impression of their current status:  Overall symptoms continue to improve.  He notes that he is far less bothered by a PTSD related symptoms but is continually upset by his father and his behavior.    Therapist impression of patient's current status: The patient's issues with his father is causing significant distress.  He finds  that he is worried, hurt, and frustrated quite often by these issues.     Type of psychotherapeutic technique provided:  CBT, motivational interviewing    Response to psychotherapeutic interventions:  Patient responded to interventions in the following manner: Accepting.    Progress toward short term goals:  Progress as expected: Patient is practicing skills taught in psychotherapy and seeing benefits.    Review of long-term goals:   Not done at today's visit. Date of last review of long term goals is  11/6/18  Goal reads: 1. Decrease the amount that PTSD symptoms bother me from a range of 3/10 to 10/10 down to a range of 0/10 to 3/10.  We discussed adding an additional goal of reducing the amount that his father's behaviors upset him. We will finalize this in the next session.      Diagnosis:   1. PTSD (post-traumatic stress disorder)      Plan and Follow-Up:  Patient will return for follow-up psychotherapy session in two - three weeks.    Patient will complete the following homework before our next session:  Patient plans to continue working with above-mentioned skills.    Discharge Criteria/ Planning:  Patient will continue with follow-up until therapy can be discontinued without return of symptoms.      Linda Carrillo 2/4/2019

## 2021-06-24 NOTE — PROGRESS NOTES
MENTAL HEALTH VISIT NOTE    Date of Service: 3/4/2019    Start time:2:00 pm  Stop time:2:50 pm  This is session number 5 this calendar year.  The patient was seen for individual psychotherapy    No  was required because the patient speaks and understands English.  Rashid Navarro Jr. is a 58 y.o. male is being seen today for individual psychotherapy to address the following:    Chief Complaint   Patient presents with     Anxiety     PTSD   .     NECESSITY: This individual session was necessary for the care of the patient to address difficulties in psychosocial functioning that are affected by and affect the patient's ability to cope with and heal from spinal conditions and are affected by the patient's mental health diagnosis listed in the diagnosis section below.    New symptoms or complaints: None    Functional Impairment (0-4):   Personal: 3  Family: 3  Work: 2  Social:2    Clinical Assessment of Mental Status  Mood: Euthymic    Affect:   Congruent with content of speech    Appearance:  casually-dressed,  and engaged,    Speech:  Within normal limits    Orientation:   Oriented to person, time, and place    Memory:  No evidence of impairment.    Concentration:  Within normal limits    Focus:   Within normal limits    Fund of knowledge:  adequate    Suicidal Ideation present:   Absent  Suicidal Risk Assessment:  No specific plan to harm self.  Patient does not endorse any intention to harm self.  Patient is aware of resources available if he experiences suicidal ideation.      Homicidal Risk Assessment:   None reported  No crisis plan required due to absence of risk.    Patient's impression of their current status:  Symptoms are improving somewhat.    Therapist impression of patient's current status: Symptoms continue to gradually improve.    Type of psychotherapeutic technique provided:  CBT, motivational interviewing    Response to psychotherapeutic interventions:  Patient responded to interventions in  the following manner: Accepting.  Somewhat avoidant.    Progress toward short term goals:  Progress as expected: Patient is practicing skills taught in psychotherapy and seeing benefits.    Review of long-term goals:  Long-term goals reviewed today:  Goals read as follows: 1. Reduce the amount that my father's behaviors and family stressors upset me from 7-8/10 to 4/10.  2. Decrease the amount that PTSD symptoms bother me from a range of 3/10 to 5/10 down to a range of 0/10 to 3/10.    Diagnosis:   1. PTSD (post-traumatic stress disorder)      Plan and Follow-Up:  Patient will return for follow-up psychotherapy session in two  weeks    Patient will complete the following homework before our next session:  Patient plans to continue working with above-mentioned skills.    Discharge Criteria/ Planning:  Patient will continue with follow-up until therapy can be discontinued without return of symptoms.      Linda Carrillo 3/4/2019

## 2021-06-24 NOTE — PROGRESS NOTES
[facility] Plan        Name:  Rashid Navarro Jr.  :  1960  MRN:  [id number]     Treatment Plan:  UpdatedTreatment Plan  Intake/initial treatment plan date:  2018  Last Updated: 3/4/19  Benefit and risks and alternatives have been discussed: Yes  Is this treatment appropriate with minimal intrusion/restrictions: Yes  Estimated duration of treatment:  Approximately 20 sessions.  Anticipated frequency of services:  Every 2 weeks  Necessity for frequency: This frequency is needed to establish therapeutic goals and for continuity of care in order to monitor progress.  Necessity for treatment: To address cognitive, behavioral, and/or emotional barriers in order to work toward goals and to improve quality of life.     1. Problem:   Interpersonal stress   Goal: Reduce the amount that my father's behaviors and family stressors upset me from 7-8/10 to 4/10    Strategies: ?[x] Learn assertive communication skills through role-play and other techniques     ?[x]  Explore thoughts and expectations about self and others      ?[x]  Learn and practice relaxation techniques and other coping         strategies     [x] Consider inviting others to attend conjoint sessions, and/or          consider referral for couples  or family therapy     ?[x] Consider introduction of bibliotherapy and/or videos     ?     Degree to which this is a problem (1-4): 4  Degree to which goal is met (1-4): 0  Date goal was initiated: 3/4/2019    Plan:   2. Problem:                                                                             ?              ?PTSD                 Goal:    Decrease the amount that PTSD symptoms bother me from a range of 3/10 to 5/10 down to a range of 0/10 to 3/10.              Strategies:       ? [x]Learn and practice relaxation techniques and other coping                                         strategies (e.g., thought stopping, reframing, meditation)                                       ? [x] Increase involvement in meaningful activities                                      ? [x] Discuss sleep hygiene                                      ? [x] Explore thoughts and expectations about self and others                                      ? [x] Identify and monitor triggers for panic/anxiety symptoms                                      ? [x] Implement physical activity routine (with physician approval)                                      ? [x] Consider introduction of bibliotherapy and/or videos                                      ? [x] Continue compliance with medical treatment plan (or explore                                      barriers)                                           [x] EMDR or Prolonged Exposure     Degree to which this is a problem (1-4): 2  Degree to which goal is met (1-4) 2  Date goal was initiated: 11/6/2018  Date of Review: 3/4/2019  Treatment Goal Status:continued, but revised as follows:baseline adjusted downward          Functional Impairment: 1=Not at all/Rarely  2=Some days  3=Most Days  4=Every Day     Personal: 3  Family: 3  Social: 2  Work: 3     Diagnosis:     PTSD (post-traumatic stress disorder)          Strengths: ability for insight, average or above intelligence, capable of independent living, general fund of knowledge, motivation for treatment/growth   Limitations: The patient has the following limitations: , long-standing untreated mental health symptoms  and inability to work in chosen profession due to physical limitations    Cultural Considerations: Jainism: not applicable , general cultural considerations: none; racial/ethnic background:         Persons responsible for this plan:  ? [x] Patient           ? [x] Provider         ? [] Other: __________________        Provider: Performed and documented by Linda Carrillo, [facility]     3/4/19           Patient Signature:____________________________________ Date: ______________            Therapist  Signature: __________________________________ Date: ______________

## 2021-06-25 NOTE — PROGRESS NOTES
MENTAL HEALTH VISIT NOTE    Date of Service: 3/19/2019    Start time:3:00 pm  Stop time:3:50 pm  This is session number 6 this calendar year.  The patient was seen for individual psychotherapy    No  was required because the patient speaks and understands English.  Rashid Navarro Jr. is a 58 y.o. male is being seen today for individual psychotherapy to address the following:    Chief Complaint   Patient presents with     PTSD   .     NECESSITY: This individual session was necessary for the care of the patient to address difficulties in psychosocial functioning that are affected by and affect the patient's ability to cope with and heal from spinal conditions and are affected by the patient's mental health diagnosis listed in the diagnosis section below.    New symptoms or complaints: None    Functional Impairment (0-4):   Personal: 2  Family: 3  Work: 3  Social:2    Clinical Assessment of Mental Status  Mood: Euthymic    Affect:   Congruent with content of speech    Appearance:  well-groomed, casually dressed, engaged    Speech:  Within normal limits    Orientation:   Oriented to person, time, and place    Memory:  No evidence of impairment.    Concentration:  Within normal limits    Focus:   Within normal limits    Fund of knowledge:  adequate    Suicidal Ideation present:   Absent  Suicidal Risk Assessment:  No specific plan to harm self.  Patient does not endorse any intention to harm self.  Patient is aware of resources available if he experiences suicidal ideation.      Homicidal Risk Assessment:   None reported  No crisis plan required due to absence of risk.    Patient's impression of their current status:  Overall improving.    Therapist impression of patient's current status: Continuing to improve ability to be calm around his father.    Type of psychotherapeutic technique provided:  CBT, motivational interviewing    Response to psychotherapeutic interventions:  Patient responded to interventions  in the following manner: Accepting.    Progress toward short term goals:  Progress as expected: Patient is practicing skills taught in psychotherapy and seeing benefits.    Review of long-term goals:  Not done at today's visit. Date of last review of long term goals is  3/4/19  Goals read as follows: 1. Reduce the amount that my father's behaviors and family stressors upset me from 7-8/10 to 4/10.  2. Decrease the amount that PTSD symptoms bother me from a range of 3/10 to 5/10 down to a range of 0/10 to 3/10.  Diagnosis:   1. PTSD (post-traumatic stress disorder)      Plan and Follow-Up:  Patient will return for follow-up psychotherapy session in two weeks    Patient will complete the following homework before our next session:  Patient plans to continue working with above-mentioned skills.    Discharge Criteria/ Planning:  Patient will continue with follow-up until therapy can be discontinued without return of symptoms.      Linda Carrillo 3/19/2019

## 2021-06-26 NOTE — PROGRESS NOTES
Progress Notes by Nhung James PTA at 5/4/2018 11:00 AM     Author: Nhung James PTA Service: -- Author Type: Physical Therapist Assistant    Filed: 5/4/2018  1:40 PM Encounter Date: 5/4/2018 Status: Attested Addendum    : Nhung James PTA (Physical Therapist Assistant)    Related Notes: Original Note by Nhung James PTA (Physical Therapist Assistant) filed at 5/4/2018 12:09 PM    Cosigner: Chelsea Fuller PT at 5/7/2018  8:05 AM    Attestation signed by Chelsea Fuller PT at 5/7/2018  8:05 AM    This note was reviewed by the primary PT.  PT and PTA also discussed the initiation of cervical traction including parameters, set-up and hold time.      Chelsea Fuller MSPT, DPT                 [facility] Daily Progress     Patient Name: Rashid Navarro Jr.  Date: 5/4/2018  Visit #: 2  PTA visit #:  1  Referral Diagnosis: [unfilled]  Referring provider: Beverly Galvez*  Visit Diagnosis:     ICD-10-CM    1. Radiculitis of left cervical region M54.12    2. Right lumbar radiculitis M54.16    3. Poor posture R29.3    4. Muscle weakness (generalized) M62.81          Assessment:   Pt was seen today for his first follow up appointment.   Pt with persistent L UE symptoms. Subjective report of some R UE symptoms today.    Patient is benefitting from skilled physical therapy and is making steady progress toward functional goals.  Patient is appropriate to continue with skilled physical therapy intervention, as indicated by initial plan of care.    Goal Status: On going  Pt. will demonstrate/verbalize independence in self-management of condition in : 6 weeks  Pt. will improve posture : and demonstrate posture with minimal to no cuing;and maintain posture for;5 minutes;in sitting;in standing;in 6 weeks  Patient will demonstrate proper body mechanics : for lifting;for bending;with less pain;with less difficulty;for dependent care;for chores;in 6 weeks  Pt will: be able to report less  "frequent and less intense left UE radicular sx for increased function; in 6 weeks   Pt will: be able to report less frequent and less intense right LE radciular sx for increased function; in 6 weeks     Plan / Patient Education:     Continue with initial plan of care.  Progress with home program as tolerated.   Assess cervical traction. Review HEP.    Subjective:   Pt states he used his lumbar traction last night \"I was 100% afterwards.\"  Pt has been compliant with the nerve glides.   Pain Ratin L UE, 9-10\"sciatic nerve.\"        Objective:     Exercises:  Exercise #1: Seated Nerve glide   Comment #1: Bx5   Exercise #2: Left Brachial plexus glide   Comment #2: Lx5  Exercise #3: supine piriformis stretch 30\" x2  Comment #3: ab sets 5\" x10  Exercise #4: scap retraction  Comment #4: 5\"x 10  Exercise #5: supine chin tucks  Comment #5: 5\" x5  Exercise #6: bridge  Comment #6: x5    Treatment Today     TREATMENT MINUTES COMMENTS   Evaluation     Self-care/ Home management     Manual therapy     Neuromuscular Re-education     Therapeutic Activity     Therapeutic Exercises 12 See flow sheet  Added to HEP:   -supine chin tuck  -scap retraction   -supine piriformis stretch  -bridge   Gait training     Modality__saunders cervical traction________________ 14' Cervical traction using Silva traction unit, 10' @12# static pt supine hooklying  Parameters for traction set by primary PT Chelsea Fuller on .                Total 26    Blank areas are intentional and mean the treatment did not include these items.       Nhung Cunningham,WILMERT  2018           "
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
## 2021-06-30 NOTE — PROGRESS NOTES
Progress Notes by Halina Rollins CNP at 4/15/2021 11:20 AM     Author: Halina Rollins CNP Service: -- Author Type: Nurse Practitioner    Filed: 4/15/2021 11:53 AM Encounter Date: 4/15/2021 Status: Signed    : Halina Rollins CNP (Nurse Practitioner)       Clinic Note    Assessment:     Assessment and Plan:  1. Essential hypertension: Blood pressure remains elevated at 138/98. Will increase Norvasc to 10mg daily. Home blood pressures continue to be elevated. Discussed watching salt intake, cutting back on alcohol intake. Will recheck kidney function today.   - Comprehensive Metabolic Panel  - amLODIPine (NORVASC) 10 MG tablet; Take 1 tablet (10 mg total) by mouth daily.  Dispense: 90 tablet; Refill: 0  - Watch salt intake.  - Watch alcohol intake.    2. Alcoholism /alcohol abuse (H): He is drinking around 30 drinks per week, Rum or Vodka. He denies any current abdominal pain. Will recheck liver function and INR today. Offered a referral for inpatient or outpatient alcohol treatment, he declined this. Encouraged him to cut back on drinking. He continues on Folic Acid and Thiamine.   - Comprehensive Metabolic Panel  - INR    3. Tobacco abuse disorder: He continues to smoke 1 pack per day. He is not interested in quitting, he also smokes marijuana daily.     4. Alcohol-induced polyneuropathy (H): Hx of this. He continues on no medications.     5. Insomnia, unspecified type: Discussed trying Melatonin OTC for insomnia. He was not interested in Trazodone.     6. Screening for endocrine, nutritional, metabolic and immunity disorder: Ordered today.   - HM1(CBC and Differential)      Patient was at lab and requested to talk with provider. He refused all blood work today.        Patient Instructions   You need to cut back on your drinking and smoking.    If you would like to go to rehab for alcohol treatment please let me know.    Try over the counter Melatonin for sleeping. Melatonin 1-3  mg is a good place to start with dosing.     Continue to take your blood pressure one hour after your medications, record your readings, and I will see you back in a month for follow up.      Patient Education     Alcohol Addiction  Does your drinking harm yourself or others? Or has it led to other problems with your daily life? If so, you may be addicted to alcohol.  You may have what's called an alcohol use disorder. Your healthcare provider may make this diagnosis if you have had at least 2 of these problems in a year:    You drink alcohol in larger amounts or for a longer period than you planned.    You often want to cut down or control how much you drink. Or you have often failed to do so.    You spend a lot of time getting alcohol, using it, or recovering from its use.    You crave or have a strong desire or urge to drink.    Your drinking makes it hard for you to be responsible at work, school, or home.    You keep on drinking even though you have had problems in relationships or social settings because of it.    You give up or miss important social, work, or other activities because of your drinking.    You drink alcohol at times when it's not physically safe, such as drinking then driving.    You keep on drinking even though you know it has caused physical or emotional problems.    You need more and more alcohol to get the same effects.    You hide how much you drink from family and friends.    You have withdrawal symptoms or use alcohol to avoid such symptoms.  Date Last Reviewed: 2/1/2017 2000-2019 [employer]. [address] [place] [address]. All rights reserved. This information is not intended as a substitute for professional medical care. Always follow your healthcare professional's instructions.           Patient Education     Eating Heart-Healthy Foods  Eating has a big impact on your heart health. In fact, eating healthier can improve several of your heart risks at once.  For instance, it helps you manage weight, cholesterol, and blood pressure. Here are ideas to help you make heart-healthy changes without giving up all the foods and flavors you love.  Getting started    Talk with your healthcare provider about eating plans, such as the DASH or Mediterranean diet. You may also be referred to a dietitian.    Change a few things at a time. Give yourself time to get used to a few eating changes before adding more.    Work to create a tasty, healthy eating plan that you can stick to for the rest of your life.    Goals for healthy eating  Below are some tips to improve your eating habits:    Limit saturated fats and trans fats. Saturated fats raise your levels of cholesterol, so keep these fats to a minimum. They are found in foods such as fatty meats, whole milk, cheese, and palm and coconut oils. Avoid trans fats because they lower good cholesterol as well as raise bad cholesterol. Trans fats are most often found in processed foods.    Reduce sodium (salt) intake. Eating too much salt may increase your blood pressure. Limit your sodium intake to 2,300 milligrams (mg) per day (the amount in 1 teaspoon of salt), or less if your healthcare provider recommends it. Dining out less often and eating fewer processed foods are two great ways to decrease the amount of salt you consume.    Managing calories. A calorie is a unit of energy. Your body burns calories for fuel, but if you eat more calories than your body burns, the extras are stored as fat. Your healthcare provider can help you create a diet plan to manage your calories. This will likely include eating healthier foods as well as exercising regularly. To help you track your progress, keep a diary to record what you eat and how often you exercise.  Choose the right foods  Aim to make these foods staples of your diet. If you have diabetes, you may have different recommendations than what is listed here:    Fruits and vegetables provide  plenty of nutrients without a lot of calories. At meals, fill half your plate with these foods. Split the other half of your plate between whole grains and lean protein.    Whole grains are high in fiber and rich in vitamins and nutrients. Good choices include whole-wheat bread, pasta, and brown rice.    Lean proteins give you nutrition with less fat. Good choices include fish, skinless chicken, and beans.    Low-fat or nonfat dairy provides nutrients without a lot of fat. Try low-fat or nonfat milk, cheese, or yogurt.    Healthy fats can be good for you in small amounts. These are unsaturated fats, such as olive oil, nuts, and fish. Try to have at least 2 servings per week of fatty fish, such as salmon, sardines, mackerel, rainbow trout, and albacore tuna. These contain omega-3 fatty acids, which are good for your heart. Flaxseed is another source of a heart-healthy fat.  More on heart-healthy eating  Read food labels  Healthy eating starts at the grocery store. Be sure to pay attention to food labels on packaged foods. Look for products that are high in fiber and protein, and low in saturated fat, cholesterol, and sodium. Avoid products that contain trans fat. And pay close attention to serving size. For instance, if you plan to eat two servings, double all the numbers on the label.  Prepare food right  A key part of healthy cooking is cutting down on added fat and salt. Look on the internet for lower-fat, lower-sodium recipes. Also, try these tips:    Remove fat from meat and skin from poultry before cooking.    Skim fat from the surface of soups and sauces.    Broil, boil, bake, steam, grill, and microwave food without added fats.    Choose ingredients that spice up your food without adding calories, fat, or sodium. Try these items: horseradish, hot sauce, lemon, mustard, nonfat salad dressings, and vinegar. For salt-free herbs and spices, try basil, cilantro, cinnamon, pepper, and rosemary.  Date Last Reviewed:  10/1/2017    5750-4756 [employer]. [address] [place] [address], [place] [address]. All rights reserved. This information is not intended as a substitute for professional medical care. Always follow your healthcare professional's instructions.             Return in about 1 month (around 5/15/2021) for Follow up.         Subjective:      Rashid Navarro Jr. is a 60 y.o. male presents today for follow up of his blood pressure.    He has been taking his blood pressure at home. In clinic today his blood pressure was 138/98; his cuff showed 169/94. He has not been taking his blood pressure after his medications, he is taking it right away when he gets up in the morning.    Watched him take his blood pressure today, he did this correctly.    He has been taking his blood pressure medication as prescribed.    He is smoking 1 pack per day, marijuana daily. He confesses to drinking 30 drinks per week, Vodka or Rum. Discussed treatment options, he does not want to pursue this.    He is the primary caregiver for his parents. Father has dementia. He is disabled, has had three previous back surgeries.    Both parents have been vaccinated, he would like to get on the COVID vaccine list.    He reports that he continues to have trouble with sleeping at night. He goes to bed around 12am-1am, wakes up around 130-2am, 3am, and then up for the day at 430-5am.    Discussed trying a low dose Trazodone for insomnia, he was not interested in this. Encouraged him to try OTC Melatonin one hour prior to bed.    He denies a fever, chills, worsening cough or shortness of breath, chest pain, chest pressure, nausea, vomiting, abdominal pain, urinary, or bowel symptoms.    The following portions of the patient's history were reviewed and updated as appropriate.    Review of Systems:    Review is otherwise negative except for what is mentioned above.     Social Hx:    Social History     Tobacco Use   Smoking Status Current Every Day Smoker    ? Packs/day: 1.00   ? Years: 33.00   ? Pack years: 33.00   ? Types: Cigarettes   Smokeless Tobacco Former User   ? Types: Chew   ? Quit date: 1988   Tobacco Comment    cutting down         Objective:     Vitals:    04/15/21 1113   BP: (!) 138/98   Pulse: 64   Weight: 130 lb (59 kg)       Exam:  General: No apparent distress. Calm. Alert and Oriented X3. Pt behavior is appropriate.  Head:Atraumatic. Normocephalic.  Lungs: Clear to auscultation, normal respiratory effort and rate.   Heart: Regular rate and rhythm, no murmurs, gallops, or rubs. Capillary refill <2 seconds. No edema.   Musculoskeletal: Walks without difficulty.   Neurologic: Interactive, alert, no focal findings.  Skin: Warm, dry.      Patient Active Problem List   Diagnosis   ? Synovial cyst   ? Lumbar radiculopathy   ? Adenomatous polyp of colon   ? Alcohol-induced polyneuropathy (H)   ? Chest pain, non-cardiac   ? CTS (carpal tunnel syndrome)   ? Dysesthesia   ? Essential hypertension   ? Folate deficiency   ? Foraminal stenosis of cervical region   ? Hand cramps   ? Heavy alcohol use   ? Leg pain   ? Macrocytic anemia   ? Melanocytic nevi of lower extremity or hip   ? Moderate alcohol use disorder (H)   ? Opioid use disorder (H)   ? Multilevel degenerative disc disease   ? Nondependent opioid abuse, episodic (H)   ? Numbness   ? Recurrent occipital headache   ? RLS (restless legs syndrome)   ? Spinal stenosis of lumbar region without neurogenic claudication   ? Tobacco use disorder   ? Ulnar neuropathy at elbow     Current Outpatient Medications   Medication Sig Dispense Refill   ? albuterol (PROAIR HFA;PROVENTIL HFA;VENTOLIN HFA) 90 mcg/actuation inhaler Inhale 1-2 puffs every 6 (six) hours as needed for wheezing or shortness of breath. 1 Inhaler 3   ? amLODIPine (NORVASC) 10 MG tablet Take 1 tablet (10 mg total) by mouth daily. 90 tablet 0   ? aspirin 325 MG EC tablet Take 1 tablet (325 mg total) by mouth daily. 90 tablet 1   ? cholecalciferol,  vitamin D3, 25 mcg (1,000 unit) capsule Take 1 capsule (1,000 Units total) by mouth daily. 90 capsule 3   ? folic acid (FOLVITE) 1 MG tablet Take 1 tablet (1 mg total) by mouth daily. 90 tablet 3   ? tamsulosin (FLOMAX) 0.4 mg cap Take 1 capsule (0.4 mg total) by mouth daily. 90 capsule 3   ? thiamine 100 MG tablet Take 1 tablet (100 mg total) by mouth daily.  0     No current facility-administered medications for this visit.          Halina Rollins, Adult-Geriatric Nurse Practitioner  [facility] - Internal Medicine Team     4/15/2021

## 2021-06-30 NOTE — PROGRESS NOTES
Progress Notes by Halina Rollins CNP at 3/15/2021 11:40 AM     Author: Halina Rollins CNP Service: -- Author Type: Nurse Practitioner    Filed: 3/15/2021 12:52 PM Encounter Date: 3/15/2021 Status: Signed    : Halina Rollins CNP (Nurse Practitioner)       Clinic Note    Assessment:     Assessment and Plan:  1. Sensation of chest pressure/Chest pain, unspecified type: Pin point chest pressure that started one month ago. No shortness of breath. EKG read by provider showed no changes, sinus bradycardia, left fascicular block. Chest x-ray read by provider today showed emphysema, no effusion or heart enlargement.   - Electrocardiogram Perform and Read  - XR Chest 2 Views  -Echo complete ordered.     2. Benign essential hypertension: Blood pressure today in office was 165/101; 192/97. He continues on Amlodipine. He is not checking his blood pressure at home. Encouraged him to do this daily. Goals are less than 140/90. Discussed watching his salt intake, goals are less than 3 grams per day. Will follow up in one month. ER if having blurred, double vision, or headaches. Suspect part of his elevated blood pressure is due to anxiety.     3. Left foot pain: Bottom of left foot. Known history alcohol induced neuropathy. Foot is very sensitive to touch. Two red lumps felt on the bottom of the foot. X-ray read by provider showed a small calcaneal spur, no fractures.   - XR Foot Left 3 or More VWS; Future    4. Enlarged prostate: Urine stream is getting somewhat smaller. Normal PSA in 08/2020. Will start flomax to help with urine stream and blood pressure.  - tamsulosin (FLOMAX) 0.4 mg cap; Take 1 capsule (0.4 mg total) by mouth daily.  Dispense: 90 capsule; Refill: 3    5. Dental abscess: Right upper tooth and gum line. Several missing teeth, dental carries. Will treat with Clindamycin. Discussed following up with a dentist.  - clindamycin (CLEOCIN) 300 MG capsule; Take 1 capsule (300 mg  total) by mouth 3 (three) times a day for 10 days.  Dispense: 30 capsule; Refill: 0    6. Alcohol-induced polyneuropathy (H): Hx of this. He has tried and failed Gabapentin and Lyrica for pain control. Will consider Duloxetine.     7. Tobacco abuse disorder: He continues to smoke 1 pack per day, chews, and also smokes marijuana daily. Encouraged him to cut back on all of these. He was not interested in medications for this.        Patient Instructions   Start checking your blood pressure on a daily basis.  Record these readings.  Update provider if running over 140/90.    Start the tamsulosin at bedtime tonight.  This should help with your urine stream and your blood pressure.    Your x-rays are processing at this time, we will update you with results once they are back.    For the tooth take the Clindamycin as prescribed, follow-up with your dentist.    Please try to cut back on smoking and drinking.  Goals for drinking are 0 beverages per week.    I will see you back in a month for recheck, before then if anything comes up.    ER if having worsening chest pain, shortness of breath.     Patient Education     Noncardiac Chest Pain    Based on your visit today, the healthcare provider doesnt know what is causing your chest pain. In most cases, people who come to the emergency department with chest pain dont have a problem with their heart. Instead, the pain is caused by other conditions. It's important for the healthcare team to be sure you are not having a life threatening cause for chest pain such as a heart attack, blood clot in the lungs, collapsed lung, ruptured esophagus, or tearing of the aorta. Once these major causes have been ruled out, you may have further evaluation for non-heart causes of chest pain. These may be problems with the lungs, muscles, bones, digestive tract, nerves, or mental health.  Lung problems    Inflammation around the lungs (pleurisy)    Collapsed lung (pneumothorax)    Fluid around  the lungs (pleural effusion)    Lung cancer (a rare cause of chest pain)  Muscle or bone problems    Inflamed cartilage between the ribs (costochondritis)    Fibromyalgia    Rheumatoid arthritis    Chest wall strain  Digestive system problems    Reflux    Stomach ulcer    Spasms of the esophagus    Gall stones    Gallbladder inflammation  Mental health conditions    Panic or anxiety attacks    Emotional distress  Your condition doesnt seem serious and your pain doesnt appear to be coming from your heart. But sometimes the signs of a serious problem take more time to appear. Watch for the warning signs listed below.  Home care  Follow these guidelines when caring for yourself at home:    Rest today and avoid strenuous activity.    Take any prescribed medicine as directed.  Follow-up care  Follow up with your healthcare provider, or as advised, if you dont start to feel better within 24 hours.  When to seek medical advice  Call your healthcare provider right away if any of these occur:    A change in the type of pain. Call if it feels different, becomes more serious, lasts longer, or begins to spread into your shoulder, arm, neck, jaw, or back.    Shortness of breath    You feel more pain when you breathe    Cough with dark-colored mucus or blood    Weakness, dizziness, or fainting    Fever of 100.4 F (38 C) or higher, or as directed by your healthcare provider    Swelling, pain, or redness in one leg  Date Last Reviewed: 12/1/2016 2000-2017 [employer] [address] [place] [address]. All rights reserved. This information is not intended as a substitute for professional medical care. Always follow your healthcare professional's instructions.             Return in about 1 month (around 4/15/2021) for Follow up.         Subjective:      Rashid Navarro Jr. is a 60 y.o. male presents today with multiple concerns.    He reports having pointed chest pain that started one month ago. He reports a  pressure like sensation on the left side of his chest, especially when he touches a certain area. He denies any shortness of breath or worsening cough with this.    He reports that the pressure is constant in nature.    He reports also having worsening pain on the bottom of his left foot. His foot is very sensitive. He has two lumps on the bottom of his foot he would like assessed today as well.    He reports also having right upper tooth pain. His gumline is swollen and painful. He thinks he has a dental abscess.    He also reports a slowed urine stream, he has noticed this over the past several months. His father has prostate issues, and thinks his is enlarged as well.    He reports taking his blood pressure medication at 11am today.     He continues to smoke at least one pack per day of cigarettes, he is also chewing.    He smokes marijuana daily. He is also socially drinking 2-3 times per week, at least two drinks at a time.    He denies a fever, chills, shortness of breath, nausea, vomiting, abdominal pain, or bowel symptoms.    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     The following portions of the patient's history were reviewed and updated as appropriate.    Review of Systems:    Review is otherwise negative except for what is mentioned above.     Social Hx:    Social History     Tobacco Use   Smoking Status Current Every Day Smoker   ? Packs/day: 1.00   ? Years: 33.00   ? Pack years: 33.00   ? Types: Cigarettes   Smokeless Tobacco Former User   ? Types: Chew   ? Quit date: 1988   Tobacco Comment    cutting down         Objective:     Vitals:    03/15/21 1148 03/15/21 1213   BP: (!) 160/101 (!) 192/97   Pulse: 65    SpO2: 99%    Weight: 131 lb (59.4 kg)        Physical Exam   Constitutional: He is oriented to person, place, and time and well-developed, well-nourished, and in no distress. No distress.   HENT:   Head: Normocephalic and atraumatic.   Right Ear: Hearing, tympanic membrane, external ear and  ear canal normal.   Left Ear: Hearing, tympanic membrane, external ear and ear canal normal.   Nose: Nose normal.   Mouth/Throat: Uvula is midline, oropharynx is clear and moist and mucous membranes are normal. Dental abscesses and dental caries present.       Eyes: Pupils are equal, round, and reactive to light. Conjunctivae and EOM are normal. No scleral icterus.   Neck: Normal range of motion. Neck supple.   Cardiovascular: Normal rate, regular rhythm and normal heart sounds. Exam reveals no gallop and no friction rub.   No murmur heard.  Pulmonary/Chest: Effort normal and breath sounds normal.       Musculoskeletal: Normal range of motion.         General: Tenderness present. No deformity or edema.      Left foot: Normal range of motion and normal capillary refill. Tenderness present. No bony tenderness, swelling, crepitus, deformity or laceration.        Feet:    Lymphadenopathy:     He has no cervical adenopathy.   Neurological: He is alert and oriented to person, place, and time. Gait normal. Coordination normal.   Skin: Skin is warm and dry. He is not diaphoretic.   Psychiatric: Mood, memory, affect and judgment normal.   Nursing note and vitals reviewed.        Patient Active Problem List   Diagnosis   ? Synovial cyst   ? Lumbar radiculopathy   ? Adenomatous polyp of colon   ? Alcohol-induced polyneuropathy (H)   ? Chest pain, non-cardiac   ? CTS (carpal tunnel syndrome)   ? Dysesthesia   ? Essential hypertension   ? Folate deficiency   ? Foraminal stenosis of cervical region   ? Hand cramps   ? Heavy alcohol use   ? Leg pain   ? Macrocytic anemia   ? Melanocytic nevi of lower extremity or hip   ? Moderate alcohol use disorder (H)   ? Opioid use disorder (H)   ? Multilevel degenerative disc disease   ? Nondependent opioid abuse, episodic (H)   ? Numbness   ? Recurrent occipital headache   ? RLS (restless legs syndrome)   ? Spinal stenosis of lumbar region without neurogenic claudication   ? Tobacco use  disorder   ? Ulnar neuropathy at elbow     Current Outpatient Medications   Medication Sig Dispense Refill   ? amLODIPine (NORVASC) 5 MG tablet Take 1 tablet (5 mg total) by mouth daily. 90 tablet 3   ? aspirin 325 MG EC tablet Take 1 tablet (325 mg total) by mouth daily. 90 tablet 1   ? cholecalciferol, vitamin D3, 25 mcg (1,000 unit) capsule Take 1 capsule (1,000 Units total) by mouth daily. 90 capsule 3   ? albuterol (PROAIR HFA;PROVENTIL HFA;VENTOLIN HFA) 90 mcg/actuation inhaler Inhale 1-2 puffs every 6 (six) hours as needed for wheezing or shortness of breath. 1 Inhaler 3   ? clindamycin (CLEOCIN) 300 MG capsule Take 1 capsule (300 mg total) by mouth 3 (three) times a day for 10 days. 30 capsule 0   ? folic acid (FOLVITE) 1 MG tablet Take 1 tablet (1 mg total) by mouth daily. 90 tablet 3   ? tamsulosin (FLOMAX) 0.4 mg cap Take 1 capsule (0.4 mg total) by mouth daily. 90 capsule 3   ? thiamine 100 MG tablet Take 1 tablet (100 mg total) by mouth daily.  0     No current facility-administered medications for this visit.          Halina Rollins, Adult-Geriatric Nurse Practitioner  [facility] - Internal Medicine Team     3/15/2021

## 2021-08-18 DIAGNOSIS — I10 ESSENTIAL HYPERTENSION: ICD-10-CM

## 2021-08-22 NOTE — TELEPHONE ENCOUNTER
"Routing refill request to provider for review/approval because:  Labs out of range:  BP    Last Written Prescription Date:  4/15/21  Last Fill Quantity: 90,  # refills: 0   Last office visit provider:  4/15/21     Requested Prescriptions   Pending Prescriptions Disp Refills     amLODIPine (NORVASC) 10 MG tablet [Pharmacy Med Name: AMLODIPINE BESYLATE 10MG TABLETS] 90 tablet 0     Sig: TAKE 1 TABLET(10 MG) BY MOUTH DAILY       Calcium Channel Blockers Protocol  Failed - 8/18/2021  8:01 PM        Failed - Blood pressure under 140/90 in past 12 months     BP Readings from Last 3 Encounters:   04/15/21 (!) 138/98   03/15/21 (!) 192/97   08/21/20 138/82                 Passed - Recent (12 mo) or future (30 days) visit within the authorizing provider's specialty     Patient has had an office visit with the authorizing provider or a provider within the authorizing providers department within the previous 12 mos or has a future within next 30 days. See \"Patient Info\" tab in inbasket, or \"Choose Columns\" in Meds & Orders section of the refill encounter.              Passed - Medication is active on med list        Passed - Patient is age 18 or older        Passed - Normal serum creatinine on file in past 12 months     Recent Labs   Lab Test 08/21/20  1202   CR 0.82       Ok to refill medication if creatinine is low               Albin Gonzalez RN 08/22/21 3:12 PM  "

## 2021-08-23 RX ORDER — AMLODIPINE BESYLATE 10 MG/1
TABLET ORAL
Qty: 90 TABLET | Refills: 0 | Status: SHIPPED | OUTPATIENT
Start: 2021-08-23 | End: 2022-09-26

## 2021-09-15 DIAGNOSIS — E53.8 FOLIC ACID DEFICIENCY: ICD-10-CM

## 2021-09-15 DIAGNOSIS — E56.9 VITAMIN DEFICIENCY: ICD-10-CM

## 2021-09-15 DIAGNOSIS — I10 BENIGN ESSENTIAL HYPERTENSION: Primary | ICD-10-CM

## 2021-09-15 RX ORDER — FOLIC ACID 1 MG/1
TABLET ORAL
Qty: 90 TABLET | Refills: 3 | Status: SHIPPED | OUTPATIENT
Start: 2021-09-15 | End: 2022-03-28

## 2021-09-15 RX ORDER — AMLODIPINE BESYLATE 5 MG/1
TABLET ORAL
Qty: 90 TABLET | Refills: 3 | Status: SHIPPED | OUTPATIENT
Start: 2021-09-15 | End: 2022-09-01

## 2021-09-15 NOTE — TELEPHONE ENCOUNTER
"Routing refill request to provider for review/approval because:  Labs out of range:  BP  Labs not current:  Cr    Last Written Prescription Date:  8/23/21  Last Fill Quantity: 90,  # refills: 0   Last office visit provider:  4/15/21     Requested Prescriptions   Pending Prescriptions Disp Refills     amLODIPine (NORVASC) 5 MG tablet [Pharmacy Med Name: AMLODIPINE BESYLATE 5MG TABLETS] 90 tablet      Sig: TAKE 1 TABLET(5 MG) BY MOUTH DAILY       Calcium Channel Blockers Protocol  Failed - 9/15/2021  6:07 AM        Failed - Blood pressure under 140/90 in past 12 months     BP Readings from Last 3 Encounters:   04/15/21 (!) 138/98   03/15/21 (!) 192/97   08/21/20 138/82                 Failed - Normal serum creatinine on file in past 12 months     Recent Labs   Lab Test 08/21/20  1202   CR 0.82       Ok to refill medication if creatinine is low          Passed - Recent (12 mo) or future (30 days) visit within the authorizing provider's specialty     Patient has had an office visit with the authorizing provider or a provider within the authorizing providers department within the previous 12 mos or has a future within next 30 days. See \"Patient Info\" tab in inbasket, or \"Choose Columns\" in Meds & Orders section of the refill encounter.              Passed - Medication is active on med list        Passed - Patient is age 18 or older           folic acid (FOLVITE) 1 MG tablet [Pharmacy Med Name: FOLIC ACID 1MG TABLETS] 90 tablet 3     Sig: TAKE 1 TABLET(1 MG) BY MOUTH DAILY       Vitamin Supplements (Adult) Protocol Passed - 9/15/2021  6:07 AM        Passed - High dose Vitamin D not ordered        Passed - Recent (12 mo) or future (30 days) visit within the authorizing provider's specialty     Patient has had an office visit with the authorizing provider or a provider within the authorizing providers department within the previous 12 mos or has a future within next 30 days. See \"Patient Info\" tab in inbasket, or \"Choose " "Columns\" in Meds & Orders section of the refill encounter.              Passed - Medication is active on med list             shelia tay RN 09/15/21 4:37 PM  "

## 2021-09-22 DIAGNOSIS — R06.02 SOB (SHORTNESS OF BREATH): ICD-10-CM

## 2021-09-23 NOTE — TELEPHONE ENCOUNTER
"Routing refill request to provider for review/approval because:  Last office visit patient advised to follow up in a month. Routing to PCP to advise on refill.     Last Written Prescription Date:  8/21/2020  Last Fill Quantity: 1 inhaer,  # refills: 3   Last office visit provider:  4/15/21    Requested Prescriptions   Pending Prescriptions Disp Refills     albuterol (PROAIR HFA/PROVENTIL HFA/VENTOLIN HFA) 108 (90 Base) MCG/ACT inhaler [Pharmacy Med Name: ALBUTEROL HFA INH(200 PUFFS)18GM] 18 g      Sig: INHALE 1-2 PUFFS BY MOUTH EVERY 6 HOURS AS NEEDED FOR WHEEZING OR SHORTNESS OF BREATH       Asthma Maintenance Inhalers - Anticholinergics Passed - 9/22/2021  5:30 PM        Passed - Patient is age 12 years or older        Passed - Recent (12 mo) or future (30 days) visit within the authorizing provider's specialty     Patient has had an office visit with the authorizing provider or a provider within the authorizing providers department within the previous 12 mos or has a future within next 30 days. See \"Patient Info\" tab in inbasket, or \"Choose Columns\" in Meds & Orders section of the refill encounter.              Passed - Medication is active on med list       Short-Acting Beta Agonist Inhalers Protocol  Passed - 9/22/2021  5:30 PM        Passed - Patient is age 12 or older        Passed - Recent (12 mo) or future (30 days) visit within the authorizing provider's specialty     Patient has had an office visit with the authorizing provider or a provider within the authorizing providers department within the previous 12 mos or has a future within next 30 days. See \"Patient Info\" tab in inbasket, or \"Choose Columns\" in Meds & Orders section of the refill encounter.              Passed - Medication is active on med list             Nela Saldana RN 09/23/21 11:22 AM  "

## 2021-09-24 RX ORDER — ALBUTEROL SULFATE 90 UG/1
AEROSOL, METERED RESPIRATORY (INHALATION)
Qty: 18 G | Refills: 3 | Status: SHIPPED | OUTPATIENT
Start: 2021-09-24 | End: 2022-09-25

## 2021-10-04 ENCOUNTER — HOSPITAL ENCOUNTER (EMERGENCY)
Facility: CLINIC | Age: 61
Discharge: HOME | End: 2021-10-04
Attending: EMERGENCY MEDICINE | Admitting: EMERGENCY MEDICINE
Payer: COMMERCIAL

## 2021-10-04 ENCOUNTER — APPOINTMENT (OUTPATIENT)
Dept: CT IMAGING | Facility: CLINIC | Age: 61
End: 2021-10-04
Payer: COMMERCIAL

## 2021-10-04 ENCOUNTER — TELEPHONE (OUTPATIENT)
Dept: EMERGENCY MEDICINE | Facility: CLINIC | Age: 61
End: 2021-10-04

## 2021-10-04 ENCOUNTER — NURSE TRIAGE (OUTPATIENT)
Dept: NURSING | Facility: CLINIC | Age: 61
End: 2021-10-04

## 2021-10-04 VITALS
HEART RATE: 54 BPM | DIASTOLIC BLOOD PRESSURE: 71 MMHG | RESPIRATION RATE: 20 BRPM | TEMPERATURE: 98.5 F | OXYGEN SATURATION: 94 % | SYSTOLIC BLOOD PRESSURE: 126 MMHG

## 2021-10-04 DIAGNOSIS — R10.84 ABDOMINAL PAIN, GENERALIZED: ICD-10-CM

## 2021-10-04 DIAGNOSIS — Z20.822 PERSON UNDER INVESTIGATION FOR COVID-19: ICD-10-CM

## 2021-10-04 LAB
ABO/RH(D): NORMAL
ALBUMIN SERPL-MCNC: 4.1 G/DL (ref 3.5–5)
ALP SERPL-CCNC: 134 U/L (ref 45–120)
ALT SERPL W P-5'-P-CCNC: 40 U/L (ref 0–45)
ANION GAP SERPL CALCULATED.3IONS-SCNC: 12 MMOL/L (ref 5–18)
ANTIBODY SCREEN: NEGATIVE
APTT PPP: 31 SECONDS (ref 22–38)
AST SERPL W P-5'-P-CCNC: 50 U/L (ref 0–40)
ATRIAL RATE - MUSE: 57 BPM
BILIRUB SERPL-MCNC: 0.5 MG/DL (ref 0–1)
BUN SERPL-MCNC: 14 MG/DL (ref 8–22)
CALCIUM SERPL-MCNC: 9.4 MG/DL (ref 8.5–10.5)
CHLORIDE BLD-SCNC: 102 MMOL/L (ref 98–107)
CO2 SERPL-SCNC: 22 MMOL/L (ref 22–31)
CREAT SERPL-MCNC: 0.79 MG/DL (ref 0.7–1.3)
D DIMER PPP FEU-MCNC: 0.44 UG/ML FEU (ref 0–0.5)
DIASTOLIC BLOOD PRESSURE - MUSE: 81 MMHG
ERYTHROCYTE [DISTWIDTH] IN BLOOD BY AUTOMATED COUNT: 12.5 % (ref 10–15)
GFR SERPL CREATININE-BSD FRML MDRD: >90 ML/MIN/1.73M2
GLUCOSE BLD-MCNC: 97 MG/DL (ref 70–125)
HCT VFR BLD AUTO: 43.4 % (ref 40–53)
HGB BLD-MCNC: 15.7 G/DL (ref 13.3–17.7)
INR PPP: 0.93 (ref 0.85–1.15)
INTERNAL QC CHECK POCT: NORMAL
INTERPRETATION ECG - MUSE: NORMAL
LIPASE SERPL-CCNC: <9 U/L (ref 0–52)
MCH RBC QN AUTO: 36.3 PG (ref 26.5–33)
MCHC RBC AUTO-ENTMCNC: 36.2 G/DL (ref 31.5–36.5)
MCV RBC AUTO: 101 FL (ref 78–100)
OCCULT BLOOD POCT: NEGATIVE
P AXIS - MUSE: 75 DEGREES
PLATELET # BLD AUTO: 250 10E3/UL (ref 150–450)
POTASSIUM BLD-SCNC: 4.1 MMOL/L (ref 3.5–5)
PR INTERVAL - MUSE: 140 MS
PROT SERPL-MCNC: 7.4 G/DL (ref 6–8)
QRS DURATION - MUSE: 96 MS
QT - MUSE: 424 MS
QTC - MUSE: 412 MS
R AXIS - MUSE: -46 DEGREES
RBC # BLD AUTO: 4.32 10E6/UL (ref 4.4–5.9)
SARS-COV-2 RNA RESP QL NAA+PROBE: POSITIVE
SODIUM SERPL-SCNC: 136 MMOL/L (ref 136–145)
SPECIMEN EXPIRATION DATE: NORMAL
SYSTOLIC BLOOD PRESSURE - MUSE: 144 MMHG
T AXIS - MUSE: 58 DEGREES
TEST CARD EXPIRATION DATE: NORMAL
TEST CARD LOT NUMBER: NORMAL
TROPONIN I SERPL-MCNC: <0.01 NG/ML (ref 0–0.29)
VENTRICULAR RATE- MUSE: 57 BPM
WBC # BLD AUTO: 5 10E3/UL (ref 4–11)

## 2021-10-04 PROCEDURE — 83690 ASSAY OF LIPASE: CPT | Performed by: EMERGENCY MEDICINE

## 2021-10-04 PROCEDURE — 80053 COMPREHEN METABOLIC PANEL: CPT | Performed by: EMERGENCY MEDICINE

## 2021-10-04 PROCEDURE — 96374 THER/PROPH/DIAG INJ IV PUSH: CPT

## 2021-10-04 PROCEDURE — 93005 ELECTROCARDIOGRAM TRACING: CPT | Performed by: EMERGENCY MEDICINE

## 2021-10-04 PROCEDURE — 36415 COLL VENOUS BLD VENIPUNCTURE: CPT | Performed by: EMERGENCY MEDICINE

## 2021-10-04 PROCEDURE — 74176 CT ABD & PELVIS W/O CONTRAST: CPT

## 2021-10-04 PROCEDURE — 93005 ELECTROCARDIOGRAM TRACING: CPT

## 2021-10-04 PROCEDURE — 86901 BLOOD TYPING SEROLOGIC RH(D): CPT | Performed by: EMERGENCY MEDICINE

## 2021-10-04 PROCEDURE — 96375 TX/PRO/DX INJ NEW DRUG ADDON: CPT

## 2021-10-04 PROCEDURE — 85730 THROMBOPLASTIN TIME PARTIAL: CPT | Performed by: EMERGENCY MEDICINE

## 2021-10-04 PROCEDURE — 84484 ASSAY OF TROPONIN QUANT: CPT | Performed by: EMERGENCY MEDICINE

## 2021-10-04 PROCEDURE — 99285 EMERGENCY DEPT VISIT HI MDM: CPT | Mod: 25

## 2021-10-04 PROCEDURE — 85014 HEMATOCRIT: CPT | Performed by: EMERGENCY MEDICINE

## 2021-10-04 PROCEDURE — 250N000011 HC RX IP 250 OP 636: Performed by: EMERGENCY MEDICINE

## 2021-10-04 PROCEDURE — 85379 FIBRIN DEGRADATION QUANT: CPT | Performed by: EMERGENCY MEDICINE

## 2021-10-04 PROCEDURE — U0005 INFEC AGEN DETEC AMPLI PROBE: HCPCS | Performed by: EMERGENCY MEDICINE

## 2021-10-04 PROCEDURE — 82272 OCCULT BLD FECES 1-3 TESTS: CPT | Performed by: EMERGENCY MEDICINE

## 2021-10-04 PROCEDURE — 85610 PROTHROMBIN TIME: CPT | Performed by: EMERGENCY MEDICINE

## 2021-10-04 PROCEDURE — C9803 HOPD COVID-19 SPEC COLLECT: HCPCS

## 2021-10-04 RX ORDER — HYDROMORPHONE HYDROCHLORIDE 1 MG/ML
0.5 INJECTION, SOLUTION INTRAMUSCULAR; INTRAVENOUS; SUBCUTANEOUS ONCE
Status: COMPLETED | OUTPATIENT
Start: 2021-10-04 | End: 2021-10-04

## 2021-10-04 RX ORDER — ONDANSETRON 2 MG/ML
4 INJECTION INTRAMUSCULAR; INTRAVENOUS ONCE
Status: COMPLETED | OUTPATIENT
Start: 2021-10-04 | End: 2021-10-04

## 2021-10-04 RX ADMIN — HYDROMORPHONE HYDROCHLORIDE 0.5 MG: 1 INJECTION, SOLUTION INTRAMUSCULAR; INTRAVENOUS; SUBCUTANEOUS at 16:14

## 2021-10-04 RX ADMIN — ONDANSETRON 4 MG: 2 INJECTION INTRAMUSCULAR; INTRAVENOUS at 16:18

## 2021-10-04 ASSESSMENT — ENCOUNTER SYMPTOMS
NAUSEA: 1
SHORTNESS OF BREATH: 1
ABDOMINAL PAIN: 1
BLOOD IN STOOL: 1
CHILLS: 1
FEVER: 1
VOMITING: 1
DYSURIA: 0
WEAKNESS: 1
COUGH: 1
APPETITE CHANGE: 1
DIARRHEA: 1

## 2021-10-04 NOTE — DISCHARGE INSTRUCTIONS
You were seen in the emergency department today for evaluation of COVID-19 concerns and abdominal pain.  Your lab work today is very reassuring with no evidence of significant blood loss, liver or kidney dysfunction, or electrolyte problems.  Your CT scan does not show any concerning findings.  Someone will call you if your Covid test is positive.    Take Tylenol 650 mg every 6 hours as needed for pain or fever.  Follow-up with your primary care provider in the next few days for recheck.  Return to the ER if you develop any new or worsening symptoms like severe pain, fever, persistent vomiting, worsening bloody or black stools, or any other concerning symptoms.

## 2021-10-04 NOTE — ED TRIAGE NOTES
Pt presents to the ED with abdominal pain that started last night.  Pt reports having black stools all night. 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 Pt was vaccinated in June.

## 2021-10-04 NOTE — ED PROVIDER NOTES
I, Dr. Mohan, have reviewed the documentation, personally taken the patient's history, performed an exam and agree with the initial workup and plan.      HPI:  Patient endorses a sudden onset of severe abdominal pain that began last night. His pain radiates to his back. He reports 6 episodes of black stool, his abdominal pain worsens with bowel movements. Additional symptoms include nausea, vomiting, shortness of breath, chest pain, dry cough, and body aches. 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     Physical Exam:   Gen: NAD  HEENT: PERRL, NCAT  Heart: RRR w/o m/r/g  Lungs: CTAB  Abd: NT, nondistended, no rigidity. Negative McBurney's sign.  Skin: Warm, dry, no rash  MSK: No calf tenderness/swelling b/l, 2+ pulses b/l radial and DP  Neuro: AOx3, fluent speech, no focal neuro deficits  Psych: Normal mood/affect    MDM/ED COURSE:  I agree with the initial workup and plan.   Pt seen in conjunction with FRANK Ida Garcia.  Pt presenting with abdominal pain and reported dark stools since yesterday.  Pt is actually more diffuse including in b/l hips/low back.  Abdomen quite benign here.  No melena/blood on rectal exam per Ida. Pt with reassuring vitals, but also with multiple other complaints and in the setting of caring for his elderly father who was recently admitted for covid. Pt is fully vaccinated for covid since June.  Labs and CT abd/pelvis pending.  Anticipate discharge if workup negative.      4:40 PM Hemoccult negative.  Hgb wnl, D-dimer negative, coags wnl. Awaiting CT abd/pelvis.    5:01 PM CT abd/pelvis negative.  Will discuss with pt, update on results, outpatient follow up, return precautions.  Pt remains quite well here. Will encourage him to follow up on covid result.  5:57 PM Pt remains quite well here. Discussed discharge, outpatient follow up on covid results.        Monika COUGHLIN, am serving as a  scribe to document services personally performed by Jordan Mohan DO, based on myobservations and the provider's statements to me.  I, Jordan Mohan DO, attest that Monika Jean Baptiste is acting in a scribe capacity, has observed my performance of the services and has documented them in accordance with my direction.      Jordan Mohan MD  10/04/21 1428
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
## 2021-10-04 NOTE — TELEPHONE ENCOUNTER
Triage call  Patient called to report his dad is in the hospital for covid and he was having some covid symptoms. Headache , shortness of breath, complained of pains in stomach, and Back.  He rated pain at at 10/10.  He has had black stools  4 times during the last 12 hours also c/o gastric reflux today. Patient initially calling wanting a Covid test.    Per Protocol  Go to the ED now.  Care advice given.  Verbalizes understanding and agrees with plan.    Poornima Miller RN   [facility] Nurse Advisor  11:28 AM 10/4/2021    COVID 19 Nurse Triage Plan/Patient Instructions    Please be aware that novel coronavirus (COVID-19) may be circulating in the community. If you develop symptoms such as fever, cough, or SOB or if you have concerns about the presence of another infection including coronavirus (COVID-19), please contact your health care provider or visit [email or web address].     Disposition/Instructions    ED Visit recommended. Follow protocol based instructions.     Bring Your Own Device:  Please also bring your smart device(s) (smart phones, tablets, laptops) and their charging cables for your personal use and to communicate with your care team during your visit.    Thank you for taking steps to prevent the spread of this virus.  o Limit your contact with others.  o Wear a simple mask to cover your cough.  o Wash your hands well and often.    Resources    [facility]: About COVID-19: [email or web address]    CDC: What to Do If You're Sick: www.cdc.gov/coronavirus/2019-ncov/about/steps-when-sick.html    CDC: Ending Home Isolation: www.cdc.gov/coronavirus/2019-ncov/hcp/disposition-in-home-patients.html     CDC: Caring for Someone: www.cdc.gov/coronavirus/2019-ncov/if-you-are-sick/care-for-someone.html     [facility]: Interim Guidance for Hospital Discharge to Home: www.health.[facility].[place]/diseases/coronavirus/hcp/hospdischarge.pdf    [facility] clinical trials (COVID-19  research studies): [email or web address]-clinical-trials     Below are the COVID-19 hotlines at the [place] Department [facility] ([facility]). Interpreters are available.   o For health questions: Call 988-203-1787 or 1-930.172.5938 (7 a.m. to 7 p.m.)  o For questions about schools and childcare: Call 243-906-1211 or 1-670.978.8101 (7 a.m. to 7 p.m    Reason for Disposition    Tarry or jet black-colored stool    Tarry or jet black-colored stool (not dark green)    MILD difficulty breathing (e.g., minimal/no SOB at rest, SOB with walking, pulse <100)    Additional Information    Negative: SEVERE difficulty breathing (e.g., struggling for each breath, speaks in single words)    Negative: Difficult to awaken or acting confused (e.g., disoriented, slurred speech)    Negative: Bluish (or gray) lips or face now    Negative: Shock suspected (e.g., cold/pale/clammy skin, too weak to stand, low BP, rapid pulse)    Negative: Sounds like a life-threatening emergency to the triager    Negative: [1] COVID-19 exposure AND [2] has not completed COVID-19 vaccine series AND [3] no symptoms    Negative: [1] COVID-19 exposure AND [2] completed COVID-19 vaccine series (fully vaccinated) AND [3] no symptoms    Negative: COVID-19 vaccine reaction suspected (e.g., fever, headache, muscle aches) occurring during days 1-3 after getting vaccine    Negative: COVID-19 vaccine, questions about    Negative: [1] COVID-19 vaccine series completed (fully vaccinated) in past 3 months AND [2] new-onset of COVID-19 symptoms BUT [3] no known exposure    Negative: [1] Had lab test confirmed COVID-19 infection within last 3 monthsAND [2] new-onset of COVID-19 symptoms BUT [3] no known exposure    Negative: [1] Lives with someone known to have influenza (flu test positive) AND [2] flu-like symptoms (e.g., cough, runny nose, sore throat, SOB; with or without fever)    Negative: [1] Adult with possible COVID-19 symptoms AND [2] triager concerned about  "severity of symptoms or other causes    Negative: COVID-19 and breastfeeding, questions about    Negative: SEVERE or constant chest pain or pressure (Exception: mild central chest pain, present only when coughing)    Negative: MODERATE difficulty breathing (e.g., speaks in phrases, SOB even at rest, pulse 100-120)    Negative: [1] Headache AND [2] stiff neck (can't touch chin to chest)    Negative: Pale skin (pallor) of new onset or worsening    Negative: [1] MODERATE rectal bleeding (small blood clots, passing blood without stool, or toilet water turns red) AND [2] more than once a day    Negative: Diarrhea is main symptom    Negative: Stool color other than brown or tan is main concern  (no bleeding and no melena)    Negative: Shock suspected (e.g., cold/pale/clammy skin, too weak to stand, low BP, rapid pulse)    Negative: Difficult to awaken or acting confused (e.g., disoriented, slurred speech)    Negative: Passed out (i.e., lost consciousness, collapsed and was not responding)    Negative: [1] Vomiting AND [2] contains red blood or black (\"coffee ground\") material  (Exception: few red streaks in vomit that only happened once)    Negative: Sounds like a life-threatening emergency to the triager    Negative: Rectal bleeding, bloody stools, or blood in stool (bowel movement)    Protocols used: STOOLS - UNUSUAL COLOR-A-AH, RECTAL BLEEDING-A-AH, CORONAVIRUS (COVID-19) DIAGNOSED OR [id number] 3.25      "

## 2021-10-04 NOTE — ED NOTES
Patient discharged and informed he must have a ride for discharge since he had narcotics. He called a women name Nicole who will pick him him. Patient will wait for her in lobby.

## 2021-10-05 NOTE — TELEPHONE ENCOUNTER
"-Coronavirus (COVID-19) Notification    Pt unsure about mAb, will rethink and let pcp know if interested. Knows notice of result in mail may take up to 10d to receive.     Caller Name (Patient, parent, daughter/son, grandparent, etc)  Pt    Reason for call  Notify of Positive Coronavirus (COVID-19) lab results, assess symptoms,  review [facility] recommendations    Lab Result    Lab test:  2019-nCoV rRt-PCR or SARS-CoV-2 PCR    Oropharyngeal AND/OR nasopharyngeal swabs is POSITIVE for 2019-nCoV RNA/SARS-COV-2 PCR (COVID-19 virus)    RN Recommendations/Instructions per [facility] Coronavirus COVID-19 recommendations    Brief introduction script  Introduce self then review script:  \"I am calling on behalf of [employer].  We were notified that your Coronavirus test (COVID-19) for was POSITIVE for the virus.  I have some information to relay to you but first I wanted to mention that the [place] Dept of Health will be contacting you shortly [it's possible MD already called Patient] to talk to you more about how you are feeling and other people you have had contact with who might now also have the virus.  Also, [facility] is Partnering with the [facility] for Covid-19 research, you may be contacted directly by research staff.\"    Assessment (Inquire about Patient's current symptoms)   Assessment   Current Symptoms at time of phone call: (if no symptoms, document No symptoms] Stomach \"burning\", hot/cold sweats, body aches   Symptoms onset (if applicable) 10/3     If at time of call, Patients symptoms hare worsened, the Patient should contact 911 or have someone drive them to Emergency Dept promptly:      If Patient calling 911, inform 911 personal that you have tested positive for the Coronavirus (COVID-19).  Place mask on and await 911 to arrive.    If Emergency Dept, If possible, please have another adult drive you to the Emergency Dept but you need to wear mask when in contact with other " "people.      Monoclonal Antibody Administration    You may be eligible to receive a new treatment with a monoclonal antibody for preventing hospitalization in patients at high risk for complications from COVID-19.   This medication is still experimental and available on a limited basis; it is given through an IV and must be given at an infusion center. Please note that not all people who are eligible will receive the medication since it is in limited supply.     Are you interested in being considered for this medication?  No.   Does the patient fit the criteria: Patient declined    If patient qualifies based on above criteria:  \"You will be contacted if you are selected to receive this treatment in the next 1-2 business days.   This is time sensitive and if you are not selected in the next 1-2 business days, you will not receive the medication.  If you do not receive a call to schedule, you have not been selected.\"      Review information with Patient    Your result was positive. This means you have COVID-19 (coronavirus).  We have sent you a letter that reviews the information that I'll be reviewing with you now.    How can I protect others?    If you have symptoms: stay home and away from others (self-isolate) until:    You've had no fever--and no medicine that reduces fever--for 1 full day (24 hours). And       Your other symptoms have gotten better. For example, your cough or breathing has improved. And     At least 10 days have passed since your symptoms started. (If you've been told by a doctor that you have a weak immune system, wait 20 days.)     If you don't have symptoms: Stay home and away from others (self-isolate) until at least 10 days have passed since your first positive COVID-19 test. (Date test collected)    During this time:    Stay in your own room, including for meals. Use your own bathroom if you can.    Stay away from others in your home. No hugging, kissing or shaking hands. No visitors. "     Don't go to work, school or anywhere else.     Clean  high touch  surfaces often (doorknobs, counters, handles, etc.). Use a household cleaning spray or wipes. You'll find a full list on the EPA website at www.epa.gov/pesticide-registration/list-n-disinfectants-use-against-sars-cov-2.     Cover your mouth and nose with a mask, tissue or other face covering to avoid spreading germs.    Wash your hands and face often with soap and water.    Make a list of people you have been in close contact with recently, even if either of you wore a face covering.   ; Start your list from 2 days before you became ill or had a positive test.  ; Include anyone that was within 6 feet of you for a cumulative total of 15 minutes or more in 24 hours. (Example: if you sat next to Bryan for 5 minutes in the morning and 10 minutes in the afternoon, then you were in close contact for 15 minutes total that day. Bryan would be added to your list.)    A public health worker will call or text you. It is important that you answer. They will ask you questions about possible exposures to COVID-19, such as people you have been in direct contact with and places you have visited.    Tell the people on your list that you have COVID-19; they should stay away from others for 14 days starting from the last time they were in contact with you (unless you are told something different from a public health worker).     Caregivers in these groups are at risk for severe illness due to COVID-19:  o People 65 years and older  o People who live in a nursing home or long-term care facility  o People with chronic disease (lung, heart, cancer, diabetes, kidney, liver, immunologic)  o People who have a weakened immune system, including those who:  - Are in cancer treatment  - Take medicine that weakens the immune system, such as corticosteroids  - Had a bone marrow or organ transplant  - Have an immune deficiency  - Have poorly controlled HIV or AIDS  - Are obese  (body mass index of 40 or higher)  - Smoke regularly    Caregivers should wear gloves while washing dishes, handling laundry and cleaning bedrooms and bathrooms.    Wash and dry laundry with special caution. Don't shake dirty laundry, and use the warmest water setting you can.    If you have a weakened immune system, ask your doctor about other actions you should take.    For more tips, go to www.cdc.gov/coronavirus/2019-ncov/downloads/10Things.pdf.    You should not go back to work until you meet the guidelines above for ending your home isolation. You don't need to be retested for COVID-19 before going back to work--studies show that you won't spread the virus if it's been at least 10 days since your symptoms started (or 20 days, if you have a weak immune system).    Employers: This document serves as formal notice of your employee's medical guidelines for going back to work. They must meet the above guidelines before going back to work in person.    How can I take care of myself?    1. Get lots of rest. Drink extra fluids (unless a doctor has told you not to).    2. Take Tylenol (acetaminophen) for fever or pain. If you have liver or kidney problems, ask your family doctor if it's okay to take Tylenol.     Take either:     650 mg (two 325 mg pills) every 4 to 6 hours, or     1,000 mg (two 500 mg pills) every 8 hours as needed.     Note: Don't take more than 3,000 mg in one day. Acetaminophen is found in many medicines (both prescribed and over-the-counter medicines). Read all labels to be sure you don't take too much.    For children, check the Tylenol bottle for the right dose (based on their age or weight).    3. If you have other health problems (like cancer, heart failure, an organ transplant or severe kidney disease): Call your specialty clinic if you don't feel better in the next 2 days.    4. Know when to call 911: Emergency warning signs include:    Trouble breathing or shortness of breath    Pain or  pressure in the chest that doesn't go away    Feeling confused like you haven't felt before, or not being able to wake up    Bluish-colored lips or face    5. Sign up for [employer]. We know it's scary to hear that you have COVID-19. We want to track your symptoms to make sure you're okay over the next 2 weeks. Please look for an email from [employer]--this is a free, online program that we'll use to keep in touch. To sign up, follow the link in the email. Learn more at [email or web address].    Where can I get more information?    [facility]: [email or web address]    Coronavirus Basics: www.health.[facility].[place]/diseases/coronavirus/basics.html    What to Do If You're Sick: www.cdc.gov/coronavirus/2019-ncov/about/steps-when-sick.html    Ending Home Isolation: www.cdc.gov/coronavirus/2019-ncov/hcp/disposition-in-home-patients.html     Caring for Someone with COVID-19: www.cdc.gov/coronavirus/2019-ncov/if-you-are-sick/care-for-someone.html     [facility] clinical trials (COVID-19 research studies): [email or web address]     A Positive COVID-19 letter will be sent via [employer] or the mail. (Exception, no letters sent to Presurgerical/Preprocedure Patients)    Myra Calhoun

## 2022-01-22 NOTE — PROGRESS NOTES
MENTAL HEALTH VISIT NOTE    Date of Service: 4/1/2019    Start time:2:00 pm  Stop time:2:50 pm  This is session number 7 this calendar year.  The patient was seen for individual psychotherapy    No  was required because the patient speaks and understands English.  Rashid Nvaarro Jr. is a 58 y.o. male is being seen today for individual psychotherapy to address the following:    Chief Complaint   Patient presents with     PTSD   .     NECESSITY: This individual session was necessary for the care of the patient to address difficulties in psychosocial functioning that are affected by and affect the patient's ability to cope with and heal from spinal conditions and are affected by the patient's mental health diagnosis listed in the diagnosis section below.    New symptoms or complaints: None    Functional Impairment (0-4):   Personal: 3  Family: 3  Work: 2  Social:2    Clinical Assessment of Mental Status  Mood: Anxious    Affect:   Congruent with content of speech    Appearance:  well-groomed, casually dressed, engaged    Speech:  Within normal limits    Orientation:   Oriented to person, time, and place    Memory:  No evidence of impairment.    Concentration:  Within normal limits    Focus:   Within normal limits    Fund of knowledge:  adequate    Suicidal Ideation present:   Absent  Suicidal Risk Assessment:  No specific plan to harm self.  Patient does not endorse any intention to harm self.  Patient is aware of resources available if he experiences suicidal ideation.      Homicidal Risk Assessment:   None reported  No crisis plan required due to absence of risk.    Patient's impression of their current status:  Overall improving, however he had an incident that triggered several PTSD symptoms this morning    Therapist impression of patient's current status: Continuing to improve overall.  He is continuing to struggle with not wanting to approach the topic of the accident and yet being preoccupied with  [___] : [unfilled] that.    Type of psychotherapeutic technique provided:  CBT, motivational interviewing, discussion of patterns that perpetuate symptoms    Response to psychotherapeutic interventions:  Patient responded to interventions in the following manner: Enthusiastic.    Progress toward short term goals:  Progress as expected: Patient is practicing skills taught in psychotherapy and seeing benefits.    Review of long-term goals:  Not done at today's visit. Date of last review of long term goals is  3/4/19  Goals read as follows: 1. Reduce the amount that my father's behaviors and family stressors upset me from 7-8/10 to 4/10.  2. Decrease the amount that PTSD symptoms bother me from a range of 3/10 to 5/10 down to a range of 0/10 to 3/10.    Diagnosis:   1. PTSD (post-traumatic stress disorder)      Plan and Follow-Up:  Patient will return for follow-up psychotherapy session in one week    Patient will complete the following homework before our next session:  Patient plans to continue working with above-mentioned skills.    Discharge Criteria/ Planning:  Patient will continue with follow-up until therapy can be discontinued without return of symptoms.  He would like to work more next week on ways to reduce the sense of overall edginess and nervousness.    Linda Carrillo 4/1/2019     [de-identified] : EV thrombus, trace MR, [de-identified] : Cath 9/10/21 mLAD 30%, otherwise normal coronaries. Elevated LVEDP.

## 2022-03-25 ENCOUNTER — TELEPHONE (OUTPATIENT)
Dept: INTERNAL MEDICINE | Facility: CLINIC | Age: 62
End: 2022-03-25
Payer: COMMERCIAL

## 2022-03-25 DIAGNOSIS — K40.21 BILATERAL RECURRENT INGUINAL HERNIA WITHOUT OBSTRUCTION OR GANGRENE: Primary | ICD-10-CM

## 2022-03-25 NOTE — TELEPHONE ENCOUNTER
Reason for Call:  Other referral     Detailed comments: Patient called stating he went to the Ed for a hernia. Patient said he is going to get a call for a surgery, wondering if i could see a [facility] surgeon (referral). Patient states he is having lot groin pain, right leg is numb, would also like an oppion on his back. They did a CT scan and ultrasound on the 3/23. Patient would like this as soon as possible. Please advise     Phone Number Patient can be reached at: Home number on file [phone] (home)    Best Time: Any    Can we leave a detailed message on this number? YES    Call taken on 3/25/2022 at 3:53 PM by Nhung Brewer

## 2022-03-28 ENCOUNTER — OFFICE VISIT (OUTPATIENT)
Dept: SURGERY | Facility: CLINIC | Age: 62
End: 2022-03-28
Attending: NURSE PRACTITIONER
Payer: COMMERCIAL

## 2022-03-28 VITALS — WEIGHT: 131.2 LBS | BODY MASS INDEX: 20.59 KG/M2 | HEIGHT: 67 IN

## 2022-03-28 DIAGNOSIS — M48.061 FORAMINAL STENOSIS OF LUMBAR REGION: ICD-10-CM

## 2022-03-28 DIAGNOSIS — M48.061 SPINAL STENOSIS OF LUMBAR REGION WITHOUT NEUROGENIC CLAUDICATION: ICD-10-CM

## 2022-03-28 DIAGNOSIS — R10.31 INGUINAL PAIN, RIGHT: Primary | ICD-10-CM

## 2022-03-28 PROCEDURE — 99203 OFFICE O/P NEW LOW 30 MIN: CPT | Performed by: SURGERY
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
## 2022-03-31 ENCOUNTER — TRANSFERRED RECORDS (OUTPATIENT)
Dept: MULTI SPECIALTY CLINIC | Facility: CLINIC | Age: 62
End: 2022-03-31
Payer: COMMERCIAL

## 2022-04-18 DIAGNOSIS — N40.0 ENLARGED PROSTATE: Primary | ICD-10-CM

## 2022-04-20 RX ORDER — TAMSULOSIN HYDROCHLORIDE 0.4 MG/1
CAPSULE ORAL
Qty: 90 CAPSULE | Refills: 3 | Status: SHIPPED | OUTPATIENT
Start: 2022-04-20 | End: 2022-09-01

## 2022-04-20 NOTE — TELEPHONE ENCOUNTER
"Routing refill request to provider for review/approval because:  A break in medication  Patient needs to be seen because it has been more than 1 year since last office visit.    Last Written Prescription Date:  3/15/2021  Last Fill Quantity: 90,  # refills: 3   Last office visit provider:  4/15/2021     Requested Prescriptions   Pending Prescriptions Disp Refills     tamsulosin (FLOMAX) 0.4 MG capsule [Pharmacy Med Name: TAMSULOSIN 0.4MG CAPSULES] 90 capsule      Sig: TAKE 1 CAPSULE(0.4 MG) BY MOUTH DAILY       Alpha Blockers Failed - 4/20/2022  1:43 PM        Failed - Recent (12 mo) or future (30 days) visit within the authorizing provider's specialty     Patient has had an office visit with the authorizing provider or a provider within the authorizing providers department within the previous 12 mos or has a future within next 30 days. See \"Patient Info\" tab in inbasket, or \"Choose Columns\" in Meds & Orders section of the refill encounter.              Failed - Medication is active on med list        Passed - Blood pressure under 140/90 in past 12 months     BP Readings from Last 3 Encounters:   10/04/21 126/71   04/15/21 (!) 138/98   03/15/21 (!) 192/97                 Passed - Patient does not have Tadalafil, Vardenafil, or Sildenafil on their medication list        Passed - Patient is 18 years of age or older             Marine Lovelace RN 04/20/22 1:45 PM  "

## 2022-09-01 ENCOUNTER — OFFICE VISIT (OUTPATIENT)
Dept: INTERNAL MEDICINE | Facility: CLINIC | Age: 62
End: 2022-09-01
Payer: COMMERCIAL

## 2022-09-01 VITALS
SYSTOLIC BLOOD PRESSURE: 97 MMHG | WEIGHT: 124.7 LBS | BODY MASS INDEX: 19.8 KG/M2 | OXYGEN SATURATION: 95 % | DIASTOLIC BLOOD PRESSURE: 67 MMHG | HEART RATE: 76 BPM

## 2022-09-01 DIAGNOSIS — N50.3 EPIDIDYMAL CYST: ICD-10-CM

## 2022-09-01 DIAGNOSIS — M53.9 MULTILEVEL DEGENERATIVE DISC DISEASE: Primary | ICD-10-CM

## 2022-09-01 DIAGNOSIS — G47.00 INSOMNIA, UNSPECIFIED TYPE: ICD-10-CM

## 2022-09-01 PROBLEM — R25.2 HAND CRAMPS: Status: RESOLVED | Noted: 2017-11-16 | Resolved: 2022-09-01

## 2022-09-01 PROBLEM — F10.20 MODERATE ALCOHOL USE DISORDER (H): Status: RESOLVED | Noted: 2021-03-15 | Resolved: 2022-09-01

## 2022-09-01 PROBLEM — R20.8 DYSESTHESIA: Status: RESOLVED | Noted: 2021-03-15 | Resolved: 2022-09-01

## 2022-09-01 PROBLEM — R07.89 CHEST PAIN, NON-CARDIAC: Status: RESOLVED | Noted: 2017-11-16 | Resolved: 2022-09-01

## 2022-09-01 PROCEDURE — 99213 OFFICE O/P EST LOW 20 MIN: CPT | Performed by: INTERNAL MEDICINE

## 2022-09-01 RX ORDER — TAMSULOSIN HYDROCHLORIDE 0.4 MG/1
CAPSULE ORAL
Qty: 90 CAPSULE | Refills: 3 | Status: SHIPPED | OUTPATIENT
Start: 2022-09-01 | End: 2023-12-26

## 2022-09-01 ASSESSMENT — ANXIETY QUESTIONNAIRES
3. WORRYING TOO MUCH ABOUT DIFFERENT THINGS: NOT AT ALL
5. BEING SO RESTLESS THAT IT IS HARD TO SIT STILL: NOT AT ALL
GAD7 TOTAL SCORE: 3
4. TROUBLE RELAXING: NOT AT ALL
7. FEELING AFRAID AS IF SOMETHING AWFUL MIGHT HAPPEN: NEARLY EVERY DAY
GAD7 TOTAL SCORE: 3
GAD7 TOTAL SCORE: 3
7. FEELING AFRAID AS IF SOMETHING AWFUL MIGHT HAPPEN: NEARLY EVERY DAY
1. FEELING NERVOUS, ANXIOUS, OR ON EDGE: NOT AT ALL
IF YOU CHECKED OFF ANY PROBLEMS ON THIS QUESTIONNAIRE, HOW DIFFICULT HAVE THESE PROBLEMS MADE IT FOR YOU TO DO YOUR WORK, TAKE CARE OF THINGS AT HOME, OR GET ALONG WITH OTHER PEOPLE: NOT DIFFICULT AT ALL
2. NOT BEING ABLE TO STOP OR CONTROL WORRYING: NOT AT ALL
6. BECOMING EASILY ANNOYED OR IRRITABLE: NOT AT ALL
8. IF YOU CHECKED OFF ANY PROBLEMS, HOW DIFFICULT HAVE THESE MADE IT FOR YOU TO DO YOUR WORK, TAKE CARE OF THINGS AT HOME, OR GET ALONG WITH OTHER PEOPLE?: NOT DIFFICULT AT ALL

## 2022-09-01 ASSESSMENT — PATIENT HEALTH QUESTIONNAIRE - PHQ9
SUM OF ALL RESPONSES TO PHQ QUESTIONS 1-9: 3
SUM OF ALL RESPONSES TO PHQ QUESTIONS 1-9: 3
10. IF YOU CHECKED OFF ANY PROBLEMS, HOW DIFFICULT HAVE THESE PROBLEMS MADE IT FOR YOU TO DO YOUR WORK, TAKE CARE OF THINGS AT HOME, OR GET ALONG WITH OTHER PEOPLE: NOT DIFFICULT AT ALL

## 2022-09-01 NOTE — PROGRESS NOTES
Assessment & Plan   Problem List Items Addressed This Visit     Multilevel degenerative disc disease - Primary    Relevant Orders    Neurosurgery Referral      Other Visit Diagnoses     Epididymal cyst        Relevant Medications    tamsulosin (FLOMAX) 0.4 MG capsule    Insomnia, unspecified type               #1.  Epididymal cysts.  Benign nature of these were explained to him.  He can use supportive undergarments along with ice as needed.  Over-the-counter analgesics as well.  Follow-up with us as needed.    2.  Degenerative disc disease.  Neurosurgery referral was placed as per his request.  Not sure they will see him without an updated MRI of the lumbar spine    3.  Insomnia.  He was told to use 5 to 10 mg of melatonin per night.  No follow-ups on file.    Manolo Silverio MD  [facility]    Timbo Squires is a 62-year-old male patient of ADARSH Lagunas who comes in today for evaluation of a few issues.    He states that he has had occasional testicular pain for the last few months.  He states that he gets 1 or 2 episodes per month and localizes it to the superior aspect of both testicles.  No swelling or no redness.  He underwent a testicular ultrasound in March 2022 at an outside emergency room which showed benign epididymal cysts but no intratesticular masses.  No pain with urination or hematuria.    He has degenerative disc disease in his low back.  No radicular symptoms.  He states that he used to see a neurosurgeon in our system who has since left and is looking to see  for an evaluation and wants a referral.    He has been having difficulty sleeping and would likely recommendations for something that he can take over-the-counter.            Objective    BP 97/67   Pulse 76   Wt 56.6 kg (124 lb 11.2 oz)   SpO2 95%   BMI 19.80 kg/m    Body mass index is 19.8 kg/m .  Physical Exam     : Testes are descended bilaterally.  Bilateral epididymal cysts noted in  the superior poles of both testicles.

## 2022-09-02 DIAGNOSIS — M54.16 LUMBAR RADICULOPATHY: Primary | ICD-10-CM

## 2022-09-08 ENCOUNTER — MEDICAL CORRESPONDENCE (OUTPATIENT)
Dept: NEUROSURGERY | Facility: CLINIC | Age: 62
End: 2022-09-08

## 2022-09-16 ENCOUNTER — HOSPITAL ENCOUNTER (OUTPATIENT)
Dept: RADIOLOGY | Facility: CLINIC | Age: 62
Discharge: HOME | End: 2022-09-16
Attending: SURGERY
Payer: COMMERCIAL

## 2022-09-16 ENCOUNTER — HOSPITAL ENCOUNTER (OUTPATIENT)
Dept: MRI IMAGING | Facility: CLINIC | Age: 62
Discharge: HOME | End: 2022-09-16
Attending: SURGERY
Payer: COMMERCIAL

## 2022-09-16 DIAGNOSIS — M54.16 LUMBAR RADICULOPATHY: ICD-10-CM

## 2022-09-16 PROCEDURE — 72148 MRI LUMBAR SPINE W/O DYE: CPT

## 2022-09-16 PROCEDURE — 72110 X-RAY EXAM L-2 SPINE 4/>VWS: CPT

## 2022-09-20 ENCOUNTER — OFFICE VISIT (OUTPATIENT)
Dept: NEUROSURGERY | Facility: CLINIC | Age: 62
End: 2022-09-20
Attending: INTERNAL MEDICINE
Payer: COMMERCIAL

## 2022-09-20 VITALS
HEIGHT: 67 IN | HEART RATE: 54 BPM | BODY MASS INDEX: 19.46 KG/M2 | WEIGHT: 124 LBS | DIASTOLIC BLOOD PRESSURE: 69 MMHG | OXYGEN SATURATION: 97 % | SYSTOLIC BLOOD PRESSURE: 150 MMHG

## 2022-09-20 DIAGNOSIS — M53.9 MULTILEVEL DEGENERATIVE DISC DISEASE: ICD-10-CM

## 2022-09-20 PROCEDURE — 99205 OFFICE O/P NEW HI 60 MIN: CPT | Performed by: NURSE PRACTITIONER

## 2022-09-20 NOTE — NURSING NOTE
Neurosurgery consultation was requested by: Dr. Manolo Silverio  Pain: right low back pain   Radicular Pain is present: anterior right thigh pain. Pain in arches on both feet   Lhermitte sign: no   Motor complaints: denies weakness   Sensory complaints: occasional   Gait and balance issues: denies   Bowel or bladder issues: urinary urgency   Duration of SX is: 4 months   The symptoms are worse with: sleeping   The symptoms are better with: nothing   Injury:denies   Severity is: mild - moderate  Patient has tried the following conservative measures: none   RIZWAN score is:42%  OSCAR Lange

## 2022-09-20 NOTE — PATIENT INSTRUCTIONS
You can try topical cream for the pain in your back. You can consider over the counter medications like tylenol, advil etc for your pain.    Ask your primary about whether celebrex would be a good option for you.     If you wanted to further treat your back and leg pain, I would start with physical therapy and injections - with the spine center.   Let us know if you would like formal referral.     Follow up as needed if medications and traction no longer helpful.     You should quit smoking.     Surgery in the future that is not a fusion would not be guaranteed to help with your back pain, can help with radicular symptoms
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
## 2022-09-23 DIAGNOSIS — I10 ESSENTIAL HYPERTENSION: ICD-10-CM

## 2022-09-24 DIAGNOSIS — R06.02 SOB (SHORTNESS OF BREATH): ICD-10-CM

## 2022-09-24 NOTE — TELEPHONE ENCOUNTER
"Routing refill request to provider for review/approval because:  bp out of range    Last Written Prescription Date:  8/23/21  Last Fill Quantity: 90,  # refills: 0   Last office visit provider:  9/1/22     Requested Prescriptions   Pending Prescriptions Disp Refills     amLODIPine (NORVASC) 10 MG tablet [Pharmacy Med Name: AMLODIPINE BESYLATE 10MG TABLETS] 90 tablet 0     Sig: TAKE 1 TABLET(10 MG) BY MOUTH DAILY       Calcium Channel Blockers Protocol  Failed - 9/23/2022 10:21 AM        Failed - Blood pressure under 140/90 in past 12 months     BP Readings from Last 3 Encounters:   09/20/22 (!) 150/69   09/01/22 97/67   10/04/21 126/71                 Passed - Recent (12 mo) or future (30 days) visit within the authorizing provider's specialty     Patient has had an office visit with the authorizing provider or a provider within the authorizing providers department within the previous 12 mos or has a future within next 30 days. See \"Patient Info\" tab in inbasket, or \"Choose Columns\" in Meds & Orders section of the refill encounter.              Passed - Medication is active on med list        Passed - Patient is age 18 or older        Passed - Normal serum creatinine on file in past 12 months     Recent Labs   Lab Test 10/04/21  1453   CR 0.79       Ok to refill medication if creatinine is low               Alexus Rivera RN 09/24/22 12:29 PM  "

## 2022-09-25 RX ORDER — ALBUTEROL SULFATE 90 UG/1
AEROSOL, METERED RESPIRATORY (INHALATION)
Qty: 18 G | Refills: 3 | Status: SHIPPED | OUTPATIENT
Start: 2022-09-25 | End: 2023-04-10

## 2022-09-25 NOTE — TELEPHONE ENCOUNTER
"Last Written Prescription Date:  9/24/21  Last Fill Quantity: 18,  # refills: 3   Last office visit provider:  9/1/22     Requested Prescriptions   Pending Prescriptions Disp Refills     albuterol (PROAIR HFA/PROVENTIL HFA/VENTOLIN HFA) 108 (90 Base) MCG/ACT inhaler [Pharmacy Med Name: ALBUTEROL HFA INH(200 PUFFS)18GM] 18 g 3     Sig: INHALE 1-2 PUFFS BY MOUTH EVERY 6 HOURS AS NEEDED FOR WHEEZING OR SHORTNESS OF BREATH       Asthma Maintenance Inhalers - Anticholinergics Passed - 9/24/2022  2:32 PM        Passed - Patient is age 12 years or older        Passed - Recent (12 mo) or future (30 days) visit within the authorizing provider's specialty     Patient has had an office visit with the authorizing provider or a provider within the authorizing providers department within the previous 12 mos or has a future within next 30 days. See \"Patient Info\" tab in inbasket, or \"Choose Columns\" in Meds & Orders section of the refill encounter.              Passed - Medication is active on med list       Short-Acting Beta Agonist Inhalers Protocol  Passed - 9/24/2022  2:32 PM        Passed - Patient is age 12 or older        Passed - Recent (12 mo) or future (30 days) visit within the authorizing provider's specialty     Patient has had an office visit with the authorizing provider or a provider within the authorizing providers department within the previous 12 mos or has a future within next 30 days. See \"Patient Info\" tab in inbasket, or \"Choose Columns\" in Meds & Orders section of the refill encounter.              Passed - Medication is active on med list             Alexus Rivera RN 09/25/22 5:06 PM  "

## 2022-09-26 RX ORDER — AMLODIPINE BESYLATE 10 MG/1
TABLET ORAL
Qty: 90 TABLET | Refills: 0 | Status: SHIPPED | OUTPATIENT
Start: 2022-09-26 | End: 2022-12-28

## 2022-12-28 DIAGNOSIS — I10 ESSENTIAL HYPERTENSION: ICD-10-CM

## 2022-12-29 RX ORDER — AMLODIPINE BESYLATE 10 MG/1
10 TABLET ORAL DAILY
Qty: 90 TABLET | Refills: 0 | Status: SHIPPED | OUTPATIENT
Start: 2022-12-29 | End: 2023-03-10

## 2022-12-29 NOTE — TELEPHONE ENCOUNTER
"Routing refill request to provider for review/approval because:  Labs not current:  Cr  bp out of range    Last Written Prescription Date:  9/26/22  Last Fill Quantity: 90,  # refills: 0   Last office visit provider:  9/1/22     Requested Prescriptions   Pending Prescriptions Disp Refills     amLODIPine (NORVASC) 10 MG tablet 90 tablet 0     Sig: Take 1 tablet (10 mg) by mouth daily       Calcium Channel Blockers Protocol  Failed - 12/28/2022  6:08 PM        Failed - Blood pressure under 140/90 in past 12 months     BP Readings from Last 3 Encounters:   09/20/22 (!) 150/69   09/01/22 97/67   10/04/21 126/71                 Failed - Normal serum creatinine on file in past 12 months     Recent Labs   Lab Test 10/04/21  1453   CR 0.79       Ok to refill medication if creatinine is low          Passed - Recent (12 mo) or future (30 days) visit within the authorizing provider's specialty     Patient has had an office visit with the authorizing provider or a provider within the authorizing providers department within the previous 12 mos or has a future within next 30 days. See \"Patient Info\" tab in inbasket, or \"Choose Columns\" in Meds & Orders section of the refill encounter.              Passed - Medication is active on med list        Passed - Patient is age 18 or older             Alexus Rivera RN 12/29/22 11:31 AM  "

## 2023-01-25 ENCOUNTER — OFFICE VISIT (OUTPATIENT)
Dept: INTERNAL MEDICINE | Facility: CLINIC | Age: 63
End: 2023-01-25
Payer: COMMERCIAL

## 2023-01-25 VITALS
HEIGHT: 67 IN | OXYGEN SATURATION: 100 % | SYSTOLIC BLOOD PRESSURE: 148 MMHG | HEART RATE: 53 BPM | BODY MASS INDEX: 20.4 KG/M2 | WEIGHT: 130 LBS | DIASTOLIC BLOOD PRESSURE: 70 MMHG

## 2023-01-25 DIAGNOSIS — Z13.220 LIPID SCREENING: ICD-10-CM

## 2023-01-25 DIAGNOSIS — I10 ESSENTIAL HYPERTENSION: ICD-10-CM

## 2023-01-25 DIAGNOSIS — Z00.00 PHYSICAL EXAM, ANNUAL: Primary | ICD-10-CM

## 2023-01-25 DIAGNOSIS — G62.1 ALCOHOL-INDUCED POLYNEUROPATHY (H): ICD-10-CM

## 2023-01-25 DIAGNOSIS — Z12.11 COLON CANCER SCREENING: ICD-10-CM

## 2023-01-25 DIAGNOSIS — Z72.0 TOBACCO ABUSE DISORDER: ICD-10-CM

## 2023-01-25 DIAGNOSIS — Z12.5 SCREENING FOR PROSTATE CANCER: ICD-10-CM

## 2023-01-25 DIAGNOSIS — R31.9 HEMATURIA, UNSPECIFIED TYPE: ICD-10-CM

## 2023-01-25 DIAGNOSIS — F11.11 HISTORY OF OPIOID ABUSE (H): ICD-10-CM

## 2023-01-25 PROBLEM — D12.6 ADENOMATOUS POLYP OF COLON: Status: RESOLVED | Noted: 2017-01-05 | Resolved: 2023-01-25

## 2023-01-25 PROBLEM — F11.10: Status: RESOLVED | Noted: 2018-02-05 | Resolved: 2023-01-25

## 2023-01-25 PROBLEM — F11.90 OPIOID USE DISORDER: Status: RESOLVED | Noted: 2018-09-26 | Resolved: 2023-01-25

## 2023-01-25 LAB
ALBUMIN SERPL BCG-MCNC: 4.9 G/DL (ref 3.5–5.2)
ALBUMIN UR-MCNC: NEGATIVE MG/DL
ALP SERPL-CCNC: 91 U/L (ref 40–129)
ALT SERPL W P-5'-P-CCNC: 35 U/L (ref 10–50)
ANION GAP SERPL CALCULATED.3IONS-SCNC: 16 MMOL/L (ref 7–15)
APPEARANCE UR: CLEAR
AST SERPL W P-5'-P-CCNC: 27 U/L (ref 10–50)
BACTERIA #/AREA URNS HPF: ABNORMAL /HPF
BILIRUB SERPL-MCNC: 0.6 MG/DL
BILIRUB UR QL STRIP: NEGATIVE
BUN SERPL-MCNC: 14.8 MG/DL (ref 8–23)
CALCIUM SERPL-MCNC: 9.9 MG/DL (ref 8.8–10.2)
CHLORIDE SERPL-SCNC: 101 MMOL/L (ref 98–107)
CHOLEST SERPL-MCNC: 229 MG/DL
COLOR UR AUTO: YELLOW
CREAT SERPL-MCNC: 0.82 MG/DL (ref 0.67–1.17)
DEPRECATED HCO3 PLAS-SCNC: 23 MMOL/L (ref 22–29)
ERYTHROCYTE [DISTWIDTH] IN BLOOD BY AUTOMATED COUNT: 11.8 % (ref 10–15)
GFR SERPL CREATININE-BSD FRML MDRD: >90 ML/MIN/1.73M2
GLUCOSE SERPL-MCNC: 102 MG/DL (ref 70–99)
GLUCOSE UR STRIP-MCNC: NEGATIVE MG/DL
HCT VFR BLD AUTO: 47.2 % (ref 40–53)
HDLC SERPL-MCNC: 68 MG/DL
HGB BLD-MCNC: 16.5 G/DL (ref 13.3–17.7)
HGB UR QL STRIP: ABNORMAL
KETONES UR STRIP-MCNC: NEGATIVE MG/DL
LDLC SERPL CALC-MCNC: 138 MG/DL
LEUKOCYTE ESTERASE UR QL STRIP: NEGATIVE
MCH RBC QN AUTO: 35.8 PG (ref 26.5–33)
MCHC RBC AUTO-ENTMCNC: 35 G/DL (ref 31.5–36.5)
MCV RBC AUTO: 102 FL (ref 78–100)
NITRATE UR QL: NEGATIVE
NONHDLC SERPL-MCNC: 161 MG/DL
PH UR STRIP: 5.5 [PH] (ref 5–8)
PLATELET # BLD AUTO: 314 10E3/UL (ref 150–450)
POTASSIUM SERPL-SCNC: 4.5 MMOL/L (ref 3.4–5.3)
PROT SERPL-MCNC: 7.6 G/DL (ref 6.4–8.3)
PSA SERPL-MCNC: 0.7 NG/ML (ref 0–4.5)
RBC # BLD AUTO: 4.61 10E6/UL (ref 4.4–5.9)
RBC #/AREA URNS AUTO: ABNORMAL /HPF
SODIUM SERPL-SCNC: 140 MMOL/L (ref 136–145)
SP GR UR STRIP: 1.01 (ref 1–1.03)
SQUAMOUS #/AREA URNS AUTO: ABNORMAL /LPF
TRIGL SERPL-MCNC: 113 MG/DL
UROBILINOGEN UR STRIP-ACNC: 0.2 E.U./DL
WBC # BLD AUTO: 7 10E3/UL (ref 4–11)
WBC #/AREA URNS AUTO: ABNORMAL /HPF

## 2023-01-25 PROCEDURE — 80061 LIPID PANEL: CPT | Performed by: INTERNAL MEDICINE

## 2023-01-25 PROCEDURE — 81001 URINALYSIS AUTO W/SCOPE: CPT | Performed by: INTERNAL MEDICINE

## 2023-01-25 PROCEDURE — 99213 OFFICE O/P EST LOW 20 MIN: CPT | Mod: 25 | Performed by: INTERNAL MEDICINE

## 2023-01-25 PROCEDURE — 99396 PREV VISIT EST AGE 40-64: CPT | Performed by: INTERNAL MEDICINE

## 2023-01-25 PROCEDURE — G0103 PSA SCREENING: HCPCS | Performed by: INTERNAL MEDICINE

## 2023-01-25 PROCEDURE — 36415 COLL VENOUS BLD VENIPUNCTURE: CPT | Performed by: INTERNAL MEDICINE

## 2023-01-25 PROCEDURE — 85027 COMPLETE CBC AUTOMATED: CPT | Performed by: INTERNAL MEDICINE

## 2023-01-25 PROCEDURE — 80053 COMPREHEN METABOLIC PANEL: CPT | Performed by: INTERNAL MEDICINE

## 2023-01-25 ASSESSMENT — ENCOUNTER SYMPTOMS
SORE THROAT: 0
MYALGIAS: 0
FEVER: 0
HEMATOCHEZIA: 1
HEADACHES: 0
HEMATURIA: 1
COUGH: 0
NAUSEA: 0
DIZZINESS: 0
ARTHRALGIAS: 1
CONSTIPATION: 0
ABDOMINAL PAIN: 0
FREQUENCY: 0
DYSURIA: 0
PARESTHESIAS: 0
CHILLS: 0
NERVOUS/ANXIOUS: 1
WEAKNESS: 1
PALPITATIONS: 0
EYE PAIN: 0
DIARRHEA: 0
HEARTBURN: 0
SHORTNESS OF BREATH: 1
JOINT SWELLING: 1

## 2023-01-25 ASSESSMENT — ANXIETY QUESTIONNAIRES
GAD7 TOTAL SCORE: 15
5. BEING SO RESTLESS THAT IT IS HARD TO SIT STILL: NOT AT ALL
3. WORRYING TOO MUCH ABOUT DIFFERENT THINGS: NEARLY EVERY DAY
IF YOU CHECKED OFF ANY PROBLEMS ON THIS QUESTIONNAIRE, HOW DIFFICULT HAVE THESE PROBLEMS MADE IT FOR YOU TO DO YOUR WORK, TAKE CARE OF THINGS AT HOME, OR GET ALONG WITH OTHER PEOPLE: SOMEWHAT DIFFICULT
8. IF YOU CHECKED OFF ANY PROBLEMS, HOW DIFFICULT HAVE THESE MADE IT FOR YOU TO DO YOUR WORK, TAKE CARE OF THINGS AT HOME, OR GET ALONG WITH OTHER PEOPLE?: SOMEWHAT DIFFICULT
1. FEELING NERVOUS, ANXIOUS, OR ON EDGE: NEARLY EVERY DAY
GAD7 TOTAL SCORE: 15
7. FEELING AFRAID AS IF SOMETHING AWFUL MIGHT HAPPEN: NEARLY EVERY DAY
4. TROUBLE RELAXING: NEARLY EVERY DAY
2. NOT BEING ABLE TO STOP OR CONTROL WORRYING: NOT AT ALL
7. FEELING AFRAID AS IF SOMETHING AWFUL MIGHT HAPPEN: NEARLY EVERY DAY
6. BECOMING EASILY ANNOYED OR IRRITABLE: NEARLY EVERY DAY

## 2023-01-25 NOTE — LETTER
January 30, 2023      Shaw Navarro   [address] [place] [address]        Dear ,    We are writing to inform you of your test results.      Resulted Orders   PSA, screen   Result Value Ref Range    Prostate Specific Antigen Screen 0.70 0.00 - 4.50 ng/mL    Narrative    This result is obtained using the Roche Elecsys total PSA method on the raquel e801 immunoassay analyzer. Results obtained with different assay methods or kits cannot be used interchangeably.   UA Macro with Reflex to Micro and Culture - lab collect   Result Value Ref Range    Color Urine Yellow Colorless, Straw, Light Yellow, Yellow    Appearance Urine Clear Clear    Glucose Urine Negative Negative mg/dL    Bilirubin Urine Negative Negative    Ketones Urine Negative Negative mg/dL    Specific Gravity Urine 1.010 1.005 - 1.030    Blood Urine Trace (A) Negative    pH Urine 5.5 5.0 - 8.0    Protein Albumin Urine Negative Negative mg/dL    Urobilinogen Urine 0.2 0.2, 1.0 E.U./dL    Nitrite Urine Negative Negative    Leukocyte Esterase Urine Negative Negative   CBC with platelets   Result Value Ref Range    WBC Count 7.0 4.0 - 11.0 10e3/uL    RBC Count 4.61 4.40 - 5.90 10e6/uL    Hemoglobin 16.5 13.3 - 17.7 g/dL    Hematocrit 47.2 40.0 - 53.0 %     (H) 78 - 100 fL    MCH 35.8 (H) 26.5 - 33.0 pg    MCHC 35.0 31.5 - 36.5 g/dL    RDW 11.8 10.0 - 15.0 %    Platelet Count 314 150 - 450 10e3/uL   Comprehensive metabolic panel   Result Value Ref Range    Sodium 140 136 - 145 mmol/L    Potassium 4.5 3.4 - 5.3 mmol/L    Chloride 101 98 - 107 mmol/L    Carbon Dioxide (CO2) 23 22 - 29 mmol/L    Anion Gap 16 (H) 7 - 15 mmol/L    Urea Nitrogen 14.8 8.0 - 23.0 mg/dL    Creatinine 0.82 0.67 - 1.17 mg/dL    Calcium 9.9 8.8 - 10.2 mg/dL    Glucose 102 (H) 70 - 99 mg/dL    Alkaline Phosphatase 91 40 - 129 U/L    AST 27 10 - 50 U/L    ALT 35 10 - 50 U/L    Protein Total 7.6 6.4 - 8.3 g/dL    Albumin 4.9 3.5 - 5.2 g/dL    Bilirubin Total 0.6 <=1.2  mg/dL    GFR Estimate >90 >60 mL/min/1.73m2      Comment:      eGFR calculated using 2021 CKD-EPI equation.   Lipid panel reflex to direct LDL Fasting   Result Value Ref Range    Cholesterol 229 (H) <200 mg/dL    Triglycerides 113 <150 mg/dL    Direct Measure HDL 68 >=40 mg/dL    LDL Cholesterol Calculated 138 (H) <=100 mg/dL    Non HDL Cholesterol 161 (H) <130 mg/dL    Narrative    Cholesterol  Desirable:  <200 mg/dL    Triglycerides  Normal:  Less than 150 mg/dL  Borderline High:  150-199 mg/dL  High:  200-499 mg/dL  Very High:  Greater than or equal to 500 mg/dL    Direct Measure HDL  Female:  Greater than or equal to 50 mg/dL   Male:  Greater than or equal to 40 mg/dL    LDL Cholesterol  Desirable:  <100mg/dL  Above Desirable:  100-129 mg/dL   Borderline High:  130-159 mg/dL   High:  160-189 mg/dL   Very High:  >= 190 mg/dL    Non HDL Cholesterol  Desirable:  130 mg/dL  Above Desirable:  130-159 mg/dL  Borderline High:  160-189 mg/dL  High:  190-219 mg/dL  Very High:  Greater than or equal to 220 mg/dL   Urine Microscopic   Result Value Ref Range    Bacteria Urine None Seen None Seen /HPF    RBC Urine None Seen 0-2 /HPF /HPF    WBC Urine None Seen 0-5 /HPF /HPF    Squamous Epithelials Urine Few (A) None Seen /LPF    Narrative    Urine Culture not indicated       If you have any questions or concerns, please call the clinic at the number listed above.     Ray,  It was good to meet you.  Your labs look stable from the prior checks.  As discussed, we need to meet up again in the near future to go over other items that we did not have a chance to review. We also need another blood pressure check.  I would recommend that you schedule a follow up with me in the next 4-6 months    Sincerely,      Troy Ayala MD

## 2023-01-25 NOTE — PROGRESS NOTES
"  Assessment & Plan     (Z00.00) Physical exam, annual  (primary encounter diagnosis)  Comment: normal  Plan: HM items reviewed in some detail though did not get to everything so follow up this summer was requested.    (I10) Essential hypertension  Comment: elevated today though typically better  Plan: Comprehensive metabolic panel        Due to time constraints we were unable to make a decision about bumping doses. He has not been taking his medications as regularly as he should so he will plan to work on this prior to adjustment. Follow up in a few months.    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 Alcohol use totals were different to ascertain, he asserts that the prior provider \"took it personally\" which I believe relates to his concern that his volume was overblown. Regardless, reviewed that cutting down was recommended.    (Z72.0) Tobacco abuse disorder  Comment: persisting, 30pack-years  Plan: he is trying to cut down though is not current serious about wanting to quit.    (F11.11) History of opioid abuse (H)  Comment: hx of  Plan: briefly discussed his request to get tramadol. In the end, I would prefer not to use opioid/opioid-like pain medication. See plan with back issues below    (R31.9) Hematuria, unspecified type  Comment: \"staining\" of toilet--he thinks it may have been blood  Plan: UA Macro with Reflex to Micro and Culture - lab        collect, Urine Microscopic        UA today.    (Z12.5) Screening for prostate cancer  Comment: reviewed  Plan: PSA, screen        Wishes for testing.    (Z12.11) Colon cancer screening  Comment: had colonoscopy in the past, did have polyps per patient  Plan: Colonoscopy Screening  Referral        FIT was performed in the last couple of years but this would not be appropriate given his history. Referral for colonoscopy placed. Discussed.    Chronic back pain--hx of spine surgery, known " BOOKER, recently seen by neurosurgery service again though not a great surgical candidate. It is difficult to pin down his pain though no clear indicators on his MRI per report. I agree with their team in that a comprehensive approach with non-surgical options for pain management and QoL would be more prudent though he was not able to commit to this for now.                   Return in about 4 months (around 5/25/2023) for Follow up, with me.    Troy Ayala MD  [facility]    Timbo Squires is a 62 year old, presenting for the following health issues:  Establish Care (Coming from the [facility] Dr Manolo Silverio)      Healthy Habits:     Getting at least 3 servings of Calcium per day:  NO    Bi-annual eye exam:  NO    Dental care twice a year:  NO    Sleep apnea or symptoms of sleep apnea:  None    Diet:  Low fat/cholesterol    Frequency of exercise:  2-3 days/week    Duration of exercise:  Less than 15 minutes    Taking medications regularly:  No    Barriers to taking medications:  Problems remembering to take them    Medication side effects:  None    PHQ-2 Total Score: 0    Additional concerns today:  No             Review of Systems   Constitutional: Negative for chills and fever.   HENT: Negative for congestion, ear pain, hearing loss and sore throat.    Eyes: Negative for pain and visual disturbance.   Respiratory: Positive for shortness of breath. Negative for cough.    Cardiovascular: Negative for chest pain, palpitations and peripheral edema.   Gastrointestinal: Positive for hematochezia. Negative for abdominal pain, constipation, diarrhea, heartburn and nausea.   Genitourinary: Positive for hematuria. Negative for dysuria, frequency, genital sores, impotence, penile discharge and urgency.   Musculoskeletal: Positive for arthralgias and joint swelling. Negative for myalgias.   Skin: Negative for rash.   Neurological: Positive for weakness. Negative for dizziness,  "headaches and paresthesias.   Psychiatric/Behavioral: Negative for mood changes. The patient is nervous/anxious.             Objective    BP (!) 158/80   Pulse 53   Ht 1.695 m (5' 6.75\")   Wt 59 kg (130 lb)   SpO2 100%   BMI 20.51 kg/m    Body mass index is 20.51 kg/m .  Physical Exam   GENERAL: healthy, alert and no distress  EYES: Eyes grossly normal to inspection, PERRL and conjunctivae and sclerae normal  HENT: ear canals and TM's normal, nose and mouth without ulcers or lesions  NECK: no adenopathy, no asymmetry, masses, or scars and thyroid normal to palpation  RESP: lungs clear to auscultation - no rales, rhonchi or wheezes  CV: regular rate and rhythm, normal S1 S2, no S3 or S4, no murmur, click or rub, no peripheral edema and peripheral pulses strong  ABDOMEN: soft, nontender, no hepatosplenomegaly, no masses and bowel sounds normal  MS: no gross musculoskeletal defects noted, no edema  SKIN: no suspicious lesions or rashes  NEURO: Normal strength and tone, mentation intact and speech normal  PSYCH: mentation appears normal, affect normal/bright                    Answers for HPI/ROS submitted by the patient on 1/25/2023  CAITLYN 7 TOTAL SCORE: 15      "

## 2023-01-25 NOTE — LETTER
January 30, 2023      Shaw Navarro   [address] [place] [address]        Dear ,    We are writing to inform you of your test results.        Resulted Orders   PSA, screen   Result Value Ref Range    Prostate Specific Antigen Screen 0.70 0.00 - 4.50 ng/mL    Narrative    This result is obtained using the Roche Elecsys total PSA method on the raquel e801 immunoassay analyzer. Results obtained with different assay methods or kits cannot be used interchangeably.   UA Macro with Reflex to Micro and Culture - lab collect   Result Value Ref Range    Color Urine Yellow Colorless, Straw, Light Yellow, Yellow    Appearance Urine Clear Clear    Glucose Urine Negative Negative mg/dL    Bilirubin Urine Negative Negative    Ketones Urine Negative Negative mg/dL    Specific Gravity Urine 1.010 1.005 - 1.030    Blood Urine Trace (A) Negative    pH Urine 5.5 5.0 - 8.0    Protein Albumin Urine Negative Negative mg/dL    Urobilinogen Urine 0.2 0.2, 1.0 E.U./dL    Nitrite Urine Negative Negative    Leukocyte Esterase Urine Negative Negative   CBC with platelets   Result Value Ref Range    WBC Count 7.0 4.0 - 11.0 10e3/uL    RBC Count 4.61 4.40 - 5.90 10e6/uL    Hemoglobin 16.5 13.3 - 17.7 g/dL    Hematocrit 47.2 40.0 - 53.0 %     (H) 78 - 100 fL    MCH 35.8 (H) 26.5 - 33.0 pg    MCHC 35.0 31.5 - 36.5 g/dL    RDW 11.8 10.0 - 15.0 %    Platelet Count 314 150 - 450 10e3/uL   Comprehensive metabolic panel   Result Value Ref Range    Sodium 140 136 - 145 mmol/L    Potassium 4.5 3.4 - 5.3 mmol/L    Chloride 101 98 - 107 mmol/L    Carbon Dioxide (CO2) 23 22 - 29 mmol/L    Anion Gap 16 (H) 7 - 15 mmol/L    Urea Nitrogen 14.8 8.0 - 23.0 mg/dL    Creatinine 0.82 0.67 - 1.17 mg/dL    Calcium 9.9 8.8 - 10.2 mg/dL    Glucose 102 (H) 70 - 99 mg/dL    Alkaline Phosphatase 91 40 - 129 U/L    AST 27 10 - 50 U/L    ALT 35 10 - 50 U/L    Protein Total 7.6 6.4 - 8.3 g/dL    Albumin 4.9 3.5 - 5.2 g/dL    Bilirubin Total 0.6 <=1.2  mg/dL    GFR Estimate >90 >60 mL/min/1.73m2      Comment:      eGFR calculated using 2021 CKD-EPI equation.   Lipid panel reflex to direct LDL Fasting   Result Value Ref Range    Cholesterol 229 (H) <200 mg/dL    Triglycerides 113 <150 mg/dL    Direct Measure HDL 68 >=40 mg/dL    LDL Cholesterol Calculated 138 (H) <=100 mg/dL    Non HDL Cholesterol 161 (H) <130 mg/dL    Narrative    Cholesterol  Desirable:  <200 mg/dL    Triglycerides  Normal:  Less than 150 mg/dL  Borderline High:  150-199 mg/dL  High:  200-499 mg/dL  Very High:  Greater than or equal to 500 mg/dL    Direct Measure HDL  Female:  Greater than or equal to 50 mg/dL   Male:  Greater than or equal to 40 mg/dL    LDL Cholesterol  Desirable:  <100mg/dL  Above Desirable:  100-129 mg/dL   Borderline High:  130-159 mg/dL   High:  160-189 mg/dL   Very High:  >= 190 mg/dL    Non HDL Cholesterol  Desirable:  130 mg/dL  Above Desirable:  130-159 mg/dL  Borderline High:  160-189 mg/dL  High:  190-219 mg/dL  Very High:  Greater than or equal to 220 mg/dL   Urine Microscopic   Result Value Ref Range    Bacteria Urine None Seen None Seen /HPF    RBC Urine None Seen 0-2 /HPF /HPF    WBC Urine None Seen 0-5 /HPF /HPF    Squamous Epithelials Urine Few (A) None Seen /LPF    Narrative    Urine Culture not indicated       If you have any questions or concerns, please call the clinic at the number listed above.         Sincerely,      Troy Ayala MD

## 2023-03-07 DIAGNOSIS — I10 ESSENTIAL HYPERTENSION: ICD-10-CM

## 2023-03-10 RX ORDER — AMLODIPINE BESYLATE 10 MG/1
10 TABLET ORAL DAILY
Qty: 90 TABLET | Refills: 3 | Status: SHIPPED | OUTPATIENT
Start: 2023-03-10 | End: 2024-03-06

## 2023-03-10 NOTE — TELEPHONE ENCOUNTER
"Routing refill request to provider for review/approval because:  Failed - Blood pressure under 140/90 in past 12 months    Last Written Prescription Date:  12/29/2022  Last Fill Quantity: 90,  # refills: 0   Last office visit provider:  1/25/2023     Requested Prescriptions   Pending Prescriptions Disp Refills     amLODIPine (NORVASC) 10 MG tablet 90 tablet 0     Sig: Take 1 tablet (10 mg) by mouth daily       Calcium Channel Blockers Protocol  Failed - 3/9/2023  9:59 AM        Failed - Blood pressure under 140/90 in past 12 months     BP Readings from Last 3 Encounters:   01/25/23 (!) 148/70   09/20/22 (!) 150/69   09/01/22 97/67                 Passed - Recent (12 mo) or future (30 days) visit within the authorizing provider's specialty     Patient has had an office visit with the authorizing provider or a provider within the authorizing providers department within the previous 12 mos or has a future within next 30 days. See \"Patient Info\" tab in inbasket, or \"Choose Columns\" in Meds & Orders section of the refill encounter.              Passed - Medication is active on med list        Passed - Patient is age 18 or older        Passed - Normal serum creatinine on file in past 12 months     Recent Labs   Lab Test 01/25/23  1057   CR 0.82       Ok to refill medication if creatinine is low               Linda Gary RN 03/10/23 1:12 PM  "

## 2023-04-10 DIAGNOSIS — R06.02 SOB (SHORTNESS OF BREATH): ICD-10-CM

## 2023-04-11 RX ORDER — ALBUTEROL SULFATE 90 UG/1
AEROSOL, METERED RESPIRATORY (INHALATION)
Qty: 18 G | Refills: 4 | Status: SHIPPED | OUTPATIENT
Start: 2023-04-11 | End: 2024-03-06

## 2023-04-11 NOTE — TELEPHONE ENCOUNTER
"Last Written Prescription Date:  9/25/2022  Last Fill Quantity: 18,  # refills: 3   Last office visit provider:  1/25/2023   Routing to PCP   Requested Prescriptions   Pending Prescriptions Disp Refills     albuterol (PROAIR HFA/PROVENTIL HFA/VENTOLIN HFA) 108 (90 Base) MCG/ACT inhaler 18 g 3       Asthma Maintenance Inhalers - Anticholinergics Passed - 4/11/2023 11:28 AM        Passed - Patient is age 12 years or older        Passed - Recent (12 mo) or future (30 days) visit within the authorizing provider's specialty     Patient has had an office visit with the authorizing provider or a provider within the authorizing providers department within the previous 12 mos or has a future within next 30 days. See \"Patient Info\" tab in inbasket, or \"Choose Columns\" in Meds & Orders section of the refill encounter.              Passed - Medication is active on med list       Short-Acting Beta Agonist Inhalers Protocol  Passed - 4/11/2023 11:28 AM        Passed - Patient is age 12 or older        Passed - Recent (12 mo) or future (30 days) visit within the authorizing provider's specialty     Patient has had an office visit with the authorizing provider or a provider within the authorizing providers department within the previous 12 mos or has a future within next 30 days. See \"Patient Info\" tab in inbasket, or \"Choose Columns\" in Meds & Orders section of the refill encounter.              Passed - Medication is active on med list             Sabina Miller RN 04/11/23 11:28 AM  "

## 2023-12-22 DIAGNOSIS — N40.0 ENLARGED PROSTATE: Primary | ICD-10-CM

## 2023-12-26 RX ORDER — TAMSULOSIN HYDROCHLORIDE 0.4 MG/1
CAPSULE ORAL
Qty: 90 CAPSULE | Refills: 0 | Status: SHIPPED | OUTPATIENT
Start: 2023-12-26 | End: 2024-03-06

## 2024-01-04 ENCOUNTER — PATIENT OUTREACH (OUTPATIENT)
Dept: CARE COORDINATION | Facility: CLINIC | Age: 64
End: 2024-01-04
Payer: COMMERCIAL

## 2024-01-18 ENCOUNTER — PATIENT OUTREACH (OUTPATIENT)
Dept: CARE COORDINATION | Facility: CLINIC | Age: 64
End: 2024-01-18
Payer: COMMERCIAL

## 2024-03-02 ENCOUNTER — APPOINTMENT (OUTPATIENT)
Dept: MRI IMAGING | Facility: CLINIC | Age: 64
End: 2024-03-02
Payer: COMMERCIAL

## 2024-03-02 ENCOUNTER — APPOINTMENT (OUTPATIENT)
Dept: RADIOLOGY | Facility: CLINIC | Age: 64
End: 2024-03-02
Attending: EMERGENCY MEDICINE
Payer: COMMERCIAL

## 2024-03-02 ENCOUNTER — HOSPITAL ENCOUNTER (EMERGENCY)
Facility: CLINIC | Age: 64
Discharge: HOME | End: 2024-03-02
Attending: EMERGENCY MEDICINE | Admitting: EMERGENCY MEDICINE
Payer: COMMERCIAL

## 2024-03-02 VITALS
BODY MASS INDEX: 21.21 KG/M2 | WEIGHT: 132 LBS | RESPIRATION RATE: 26 BRPM | HEART RATE: 59 BPM | TEMPERATURE: 97.9 F | DIASTOLIC BLOOD PRESSURE: 92 MMHG | SYSTOLIC BLOOD PRESSURE: 177 MMHG | HEIGHT: 66 IN | OXYGEN SATURATION: 95 %

## 2024-03-02 DIAGNOSIS — T14.8XXA MUSCLE TEAR: ICD-10-CM

## 2024-03-02 LAB
ANION GAP SERPL CALCULATED.3IONS-SCNC: 12 MMOL/L (ref 7–15)
BUN SERPL-MCNC: 15.9 MG/DL (ref 8–23)
CALCIUM SERPL-MCNC: 9.3 MG/DL (ref 8.8–10.2)
CHLORIDE SERPL-SCNC: 105 MMOL/L (ref 98–107)
CREAT SERPL-MCNC: 0.99 MG/DL (ref 0.67–1.17)
DEPRECATED HCO3 PLAS-SCNC: 26 MMOL/L (ref 22–29)
EGFRCR SERPLBLD CKD-EPI 2021: 86 ML/MIN/1.73M2
GLUCOSE SERPL-MCNC: 98 MG/DL (ref 70–99)
POTASSIUM SERPL-SCNC: 4.3 MMOL/L (ref 3.4–5.3)
SODIUM SERPL-SCNC: 143 MMOL/L (ref 135–145)

## 2024-03-02 PROCEDURE — 72158 MRI LUMBAR SPINE W/O & W/DYE: CPT

## 2024-03-02 PROCEDURE — 73502 X-RAY EXAM HIP UNI 2-3 VIEWS: CPT

## 2024-03-02 PROCEDURE — 36415 COLL VENOUS BLD VENIPUNCTURE: CPT

## 2024-03-02 PROCEDURE — 99285 EMERGENCY DEPT VISIT HI MDM: CPT | Mod: 25

## 2024-03-02 PROCEDURE — A9585 GADOBUTROL INJECTION: HCPCS | Performed by: EMERGENCY MEDICINE

## 2024-03-02 PROCEDURE — 250N000013 HC RX MED GY IP 250 OP 250 PS 637

## 2024-03-02 PROCEDURE — 255N000002 HC RX 255 OP 636: Performed by: EMERGENCY MEDICINE

## 2024-03-02 PROCEDURE — 73723 MRI JOINT LWR EXTR W/O&W/DYE: CPT | Mod: LT

## 2024-03-02 PROCEDURE — 80048 BASIC METABOLIC PNL TOTAL CA: CPT

## 2024-03-02 RX ORDER — OXYCODONE HYDROCHLORIDE 5 MG/1
5 TABLET ORAL ONCE
Status: DISCONTINUED | OUTPATIENT
Start: 2024-03-02 | End: 2024-03-02

## 2024-03-02 RX ORDER — ACETAMINOPHEN 500 MG
500 TABLET ORAL EVERY 6 HOURS PRN
Qty: 28 TABLET | Refills: 0 | Status: SHIPPED | OUTPATIENT
Start: 2024-03-02 | End: 2024-03-09

## 2024-03-02 RX ORDER — LIDOCAINE 4 G/G
1 PATCH TOPICAL ONCE
Status: DISCONTINUED | OUTPATIENT
Start: 2024-03-02 | End: 2024-03-02 | Stop reason: HOSPADM

## 2024-03-02 RX ORDER — GADOBUTROL 604.72 MG/ML
6 INJECTION INTRAVENOUS ONCE
Status: COMPLETED | OUTPATIENT
Start: 2024-03-02 | End: 2024-03-02

## 2024-03-02 RX ORDER — ACETAMINOPHEN 325 MG/1
650 TABLET ORAL ONCE
Status: COMPLETED | OUTPATIENT
Start: 2024-03-02 | End: 2024-03-02

## 2024-03-02 RX ORDER — LIDOCAINE 4 G/G
1 PATCH TOPICAL EVERY 24 HOURS
Qty: 3 PATCH | Refills: 0 | Status: SHIPPED | OUTPATIENT
Start: 2024-03-02 | End: 2024-03-05

## 2024-03-02 RX ADMIN — GADOBUTROL 6 ML: 604.72 INJECTION INTRAVENOUS at 12:02

## 2024-03-02 RX ADMIN — ACETAMINOPHEN 650 MG: 325 TABLET ORAL at 10:37

## 2024-03-02 ASSESSMENT — COLUMBIA-SUICIDE SEVERITY RATING SCALE - C-SSRS
1. IN THE PAST MONTH, HAVE YOU WISHED YOU WERE DEAD OR WISHED YOU COULD GO TO SLEEP AND NOT WAKE UP?: NO
6. HAVE YOU EVER DONE ANYTHING, STARTED TO DO ANYTHING, OR PREPARED TO DO ANYTHING TO END YOUR LIFE?: NO
2. HAVE YOU ACTUALLY HAD ANY THOUGHTS OF KILLING YOURSELF IN THE PAST MONTH?: NO

## 2024-03-02 NOTE — ED NOTES
Patient given discharge instructions. Education provided regarding newly prescribed medications. Verbal understanding of follow up and when to return to ED for worsening symptoms.

## 2024-03-02 NOTE — Clinical Note
Rashid Navarro was seen and treated in our emergency department on 3/2/2024.  He may return to work on 03/05/2024.       If you have any questions or concerns, please don't hesitate to call.      Monet Bryant MD
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
## 2024-03-02 NOTE — ED PROVIDER NOTES
"Emergency Department Midlevel Supervisory Note     I had a face to face encounter with this patient seen by the Advanced Practice Provider (FRANK). I personally made/approved the management plan and take responsibility for the patient management. I personally saw patient and performed a substantive portion of the visit including all aspects of the medical decision making.     ED Course:  10:13 AM Blanche Askew PA-C staffed patient with me. I agree with their assessment and plan of management, and I will see the patient.  10:50 AM I met with the patient to introduce myself, gather additional history, perform my initial exam, and discuss the plan.     Brief HPI:     Rashid Navarro Jr. is a 63 year old male who presents for evaluation of left hip pain.    At 2 AM this morning, the patient was lifting his mother who slipped off the bed. He was on his knees to support and lift his mother when he felt a pain shoot up from his left buttock to his left hip, stating that he \"felt something rip\". Patient is able to stand on the left leg with support of crutches but reports pain with weight bearing and flexion/extension of the hip area. Has history of chronic back issues but this feels more in his buttock and hip than that pain has been previously. Denies loss of bowel or bladder control. No saddle anesthesia. No numbness.      He has a history of 3 spinal surgeries with the most recent one being a laminectomy and microdiscectomy lumbar spine in 2011. He denies any other complaints at this time.    I, Saumya Calhoun, am serving as a scribe to document services personally performed by Monet Bryant MD, based on my observations and the provider's statements to me.   I, Monet Bryant MD attest that Saumya Calhoun was acting in a scribe capacity, has observed my performance of the services and has documented them in accordance with my direction.    Brief Physical Exam: BP (!) 177/92   Pulse 59   Temp 97.9  F (36.6  C) " "(Oral)   Resp 26   Ht 1.676 m (5' 6\")   Wt 59.9 kg (132 lb)   SpO2 95%   BMI 21.31 kg/m    Constitutional:  Alert, in no acute distress  EYES: Conjunctivae clear  HENT:  Atraumatic  Respiratory:  Respirations even, unlabored, in no acute respiratory distress  Cardiovascular:  Regular rate and rhythm, good peripheral perfusion  GI: Soft, non-distended, non-tender  Musculoskeletal: No midline thoracic or lumbar spine tenderness.  Does have some tenderness to palpation of the left buttock area into the left hip area, no tenderness palpation of the remainder of the left femur, left knee, left lower leg or foot.  2+ DP and PT pulses bilaterally.  Compartments are soft and compressible.  Pelvis is stable to compression.  Integument: Warm & dry. No appreciable rash, erythema.  Neurologic:  Alert & oriented, exam has equal 5 out of 5 strength with dorsiflexion and plantarflexion bilaterally as well as knee flexion and extension bilaterally, may have slightly decreased sensation on the left with hip flexion and extension but appears to be pain related, normal strength with hip flexion extension on the right.  Psych: Normal mood and affect       MDM:  3-year-old male with history of chronic back issues and multiple prior surgeries who presents for evaluation of left buttock and hip pain after trying to lift his mother up off the floor.  On exam, he has tenderness in the left buttock area and lateral hip.  Is neurovascularly intact in the left lower extremity but does appear to have some possible slight weakness likely related more to pain however with left hip flexion extension.  No loss of bowel or bladder control or saddle anesthesia.  X-ray of the pelvis and hip on the left obtained without acute pathology.  With his extensive prior spine history and the possible weakness versus just related to pain will obtain MRI of the lumbar spine and MRI of the left hip area with and without contrast for further evaluation.  BMP " obtained which reveals normal creatinine.  Unfortunately he is in the waiting room due to severe capacity issues in the ER and thus could not receive opiates at this time and also has history of opiate abuse so was given oral Tylenol here initially.  He is already ambulating around here with crutches that he had at home.    MRI of the lumbar spine reveals moderate lumbar spondylosis superimposed on mild developmental narrowing of the lumbar spinal canal with prior laminectomy changes at L2-L3 L3-L4 and L4-L5.  At L3-L4 there is a right foraminal cranial directed disc extrusion contributing to severe right neural foraminal stenosis with deformity of the exiting right L3 nerve root.  At L4-5 there is moderate right neuroforaminal stenosis.  However his pain is on the left thus do not feel that this is contributing to his symptoms.  MRI of the left hip reveals edema and architectural distortion in the left gluteus medius muscle which would be consistent with a partial tear.  There is a small amount of nonenhancing tissue in the area as well likely and intramuscular hematoma if there is a corroborating history.  Degeneration and tear in the left acetabular labrum.  Chondromalacia of the left hip.  He is not anticoagulated.  This is likely the explanation for his symptoms.  FRANK discussed the patient with [facility] who recommends rest ice and crutches and follow-up as an outpatient.  Given indications for return.  Discharged home in stable condition.       1. Muscle tear        Labs and Imaging:  Results for orders placed or performed during the hospital encounter of 03/02/24   XR Pelvis and Hip Left 2 Views    Impression    IMPRESSION: Both hips are negative for fracture or dislocation. There is mild degenerative change. Findings are fairly symmetric. Pelvis negative for fracture. Postoperative changes prior abdominal herniorrhaphy.   MR Lumbar Spine w/o & w Contrast    Impression    IMPRESSION:  1.  Moderate  lumbar spondylosis superimposed on mild developmental narrowing of the lumbar spinal canal with prior laminectomy changes at L2-L3, L3-L4, and L4-L5.  2.  At L3-L4, there is a right foraminal cranially directed disc extrusion contributing to severe right neural foraminal stenosis with deformity of the exiting right L3 nerve root.  3.  At L4-L5, there is moderate right neural foraminal stenosis.   MR Hip Left w/o & w Contrast    Impression    IMPRESSION:  1.  The edema and architectural distortion in the left gluteus medius muscle would be consistent with a partial tear. There is a small amount of nonenhancing tissue in this area as well, likely an intramuscular hematoma if there is a corroborating   history.  2.  Degeneration and tear in the left acetabular labrum.  3.  Chondromalacia in the left hip.     Basic metabolic panel   Result Value Ref Range    Sodium 143 135 - 145 mmol/L    Potassium 4.3 3.4 - 5.3 mmol/L    Chloride 105 98 - 107 mmol/L    Carbon Dioxide (CO2) 26 22 - 29 mmol/L    Anion Gap 12 7 - 15 mmol/L    Urea Nitrogen 15.9 8.0 - 23.0 mg/dL    Creatinine 0.99 0.67 - 1.17 mg/dL    GFR Estimate 86 >60 mL/min/1.73m2    Calcium 9.3 8.8 - 10.2 mg/dL    Glucose 98 70 - 99 mg/dL       I have reviewed the relevant laboratory studies above.        Procedures:  I was present for the key portions of procedures documented in FRANK/midlevel note, see midlevel note for further details.    Monet Bryant MD  [facility] EMERGENCY ROOM  [address] [address] [phone]     Monet Bryant MD  03/09/24 3343

## 2024-03-02 NOTE — ED TRIAGE NOTES
"Patient states at 0200 today he was helping to lift his mother, when he felt a \"shock\" go down his left hip radiating to left lower back, he has chronic neck and back pain but states this is worse than baseline pain, no medications taken.  Able to ambulate with crutches but pain much worse when moving.      Triage Assessment (Adult)       Row Name 03/02/24 0922          Triage Assessment    Airway WDL WDL        Respiratory WDL    Respiratory WDL WDL        Skin Circulation/Temperature WDL    Skin Circulation/Temperature WDL WDL        Cardiac WDL    Cardiac WDL WDL        Peripheral/Neurovascular WDL    Peripheral Neurovascular WDL neurovascular assessment lower        Cognitive/Neuro/Behavioral WDL    Cognitive/Neuro/Behavioral WDL WDL        LLE Neurovascular Assessment    Temperature LLE warm     Color LLE no discoloration     Sensation LLE tenderness present        RLE Neurovascular Assessment    Temperature RLE other (see comments)     Color RLE no discoloration     Sensation RLE no tenderness;no numbness;no tingling                     "

## 2024-03-02 NOTE — DISCHARGE INSTRUCTIONS
Rest.  Orally hydrate.  Continue to use your crutches to help you walk.  Continue to ice your buttock.  Take Tylenol as prescribed.  Have also prescribed you lidocaine patches to help with your pain.  Return to the ED if new symptoms develop or symptoms worsen.  Follow-up with [facility] to discuss your ED visit.  Return to the ED if new symptoms develop or symptoms worsen.  Referred you to spine so you can follow-up about your MR findings.

## 2024-03-06 ENCOUNTER — OFFICE VISIT (OUTPATIENT)
Dept: INTERNAL MEDICINE | Facility: CLINIC | Age: 64
End: 2024-03-06
Payer: COMMERCIAL

## 2024-03-06 VITALS
HEART RATE: 67 BPM | OXYGEN SATURATION: 99 % | BODY MASS INDEX: 20.89 KG/M2 | HEIGHT: 66 IN | TEMPERATURE: 97.2 F | WEIGHT: 130 LBS | RESPIRATION RATE: 17 BRPM | DIASTOLIC BLOOD PRESSURE: 95 MMHG | SYSTOLIC BLOOD PRESSURE: 155 MMHG

## 2024-03-06 DIAGNOSIS — R06.02 SOB (SHORTNESS OF BREATH): ICD-10-CM

## 2024-03-06 DIAGNOSIS — S76.012D TEAR OF LEFT GLUTEUS MEDIUS TENDON, SUBSEQUENT ENCOUNTER: Primary | ICD-10-CM

## 2024-03-06 DIAGNOSIS — F11.11 HISTORY OF OPIOID ABUSE (H): ICD-10-CM

## 2024-03-06 DIAGNOSIS — G62.1 ALCOHOL-INDUCED POLYNEUROPATHY (H): ICD-10-CM

## 2024-03-06 DIAGNOSIS — L98.9 SKIN LESION: ICD-10-CM

## 2024-03-06 DIAGNOSIS — I10 ESSENTIAL HYPERTENSION: ICD-10-CM

## 2024-03-06 DIAGNOSIS — N40.0 ENLARGED PROSTATE: ICD-10-CM

## 2024-03-06 PROCEDURE — 99214 OFFICE O/P EST MOD 30 MIN: CPT | Performed by: INTERNAL MEDICINE

## 2024-03-06 RX ORDER — ALBUTEROL SULFATE 90 UG/1
AEROSOL, METERED RESPIRATORY (INHALATION)
Qty: 18 G | Refills: 4 | Status: SHIPPED | OUTPATIENT
Start: 2024-03-06

## 2024-03-06 RX ORDER — TAMSULOSIN HYDROCHLORIDE 0.4 MG/1
CAPSULE ORAL
Qty: 90 CAPSULE | Refills: 1 | Status: SHIPPED | OUTPATIENT
Start: 2024-03-06 | End: 2024-07-10

## 2024-03-06 RX ORDER — AMLODIPINE BESYLATE 10 MG/1
10 TABLET ORAL DAILY
Qty: 90 TABLET | Refills: 1 | Status: SHIPPED | OUTPATIENT
Start: 2024-03-06 | End: 2024-07-10

## 2024-03-06 ASSESSMENT — PAIN SCALES - GENERAL: PAINLEVEL: WORST PAIN (10)

## 2024-03-06 NOTE — PROGRESS NOTES
"  Assessment & Plan     (S76.012D) Tear of left gluteus medius tendon, subsequent encounter  (primary encounter diagnosis)  Comment: recent, see ED notes and MRI results...pain control with nsaids  Plan: has ortho appointment tomorrow.    (L98.9) Skin lesion  Comment: left dorsum of forearm, growing  Plan: Adult Dermatology  Referral        Derm referral placed.    (F11.11) History of opioid abuse (H)  Comment: abstinent at this time  Plan: continue to follow. 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    (I10) Essential hypertension  Comment: elevated today though has ongoing pain related to above injury  Plan: amLODIPine (NORVASC) 10 MG tablet        Will plan to continue on previous medication without changes   Recheck this summer, adjust if needed, hopefully his external stressors will have resolved.    (N40.0) Enlarged prostate  Comment: hx of  Plan: tamsulosin (FLOMAX) 0.4 MG capsule        Will plan to continue on previous medication without changes         Nicotine/Tobacco Cessation  He reports that he has been smoking cigarettes. He has a 33 pack-year smoking history. He quit smokeless tobacco use about 36 years ago.  His smokeless tobacco use included chew.  Nicotine/Tobacco Cessation Plan  Information offered: Patient not interested at this time            Timbo Squires is a 63 year old, presenting for the following health issues:  Hospital F/U (EDU visit 3/2/4 muscle tear left leg, hip and groin. Lesions on left forearm, hand and legs that are increasing in size over the last few months.  He needs a new Rx for his inhaler. He also having issues with his urine flow.)      3/6/2024    12:14 PM   Additional Questions   Roomed by Estephania GAMEZ     HPI                   Objective    BP (!) 155/95   Pulse 67   Temp 97.2  F (36.2  C) (Tympanic)   Resp 17   Ht 1.676 m (5' 6\")   Wt 59 kg " (130 lb)   SpO2 99%   BMI 20.98 kg/m    Body mass index is 20.98 kg/m .  Physical Exam               Signed Electronically by: Troy Ayala MD

## 2024-07-10 ENCOUNTER — OFFICE VISIT (OUTPATIENT)
Dept: INTERNAL MEDICINE | Facility: CLINIC | Age: 64
End: 2024-07-10
Payer: COMMERCIAL

## 2024-07-10 VITALS
HEART RATE: 60 BPM | BODY MASS INDEX: 20.39 KG/M2 | SYSTOLIC BLOOD PRESSURE: 160 MMHG | TEMPERATURE: 96.5 F | DIASTOLIC BLOOD PRESSURE: 80 MMHG | WEIGHT: 126.9 LBS | RESPIRATION RATE: 10 BRPM | HEIGHT: 66 IN | OXYGEN SATURATION: 99 %

## 2024-07-10 DIAGNOSIS — L98.9 SKIN LESION: Primary | ICD-10-CM

## 2024-07-10 DIAGNOSIS — I10 ESSENTIAL HYPERTENSION: ICD-10-CM

## 2024-07-10 DIAGNOSIS — Z12.5 SCREENING FOR PROSTATE CANCER: ICD-10-CM

## 2024-07-10 DIAGNOSIS — N40.0 ENLARGED PROSTATE: ICD-10-CM

## 2024-07-10 LAB
HIV 1+2 AB+HIV1 P24 AG SERPL QL IA: NONREACTIVE
PSA SERPL DL<=0.01 NG/ML-MCNC: 0.58 NG/ML (ref 0–4.5)

## 2024-07-10 PROCEDURE — 87591 N.GONORRHOEAE DNA AMP PROB: CPT | Performed by: INTERNAL MEDICINE

## 2024-07-10 PROCEDURE — 87389 HIV-1 AG W/HIV-1&-2 AB AG IA: CPT | Performed by: INTERNAL MEDICINE

## 2024-07-10 PROCEDURE — 87491 CHLMYD TRACH DNA AMP PROBE: CPT | Performed by: INTERNAL MEDICINE

## 2024-07-10 PROCEDURE — 99214 OFFICE O/P EST MOD 30 MIN: CPT | Performed by: INTERNAL MEDICINE

## 2024-07-10 PROCEDURE — 36415 COLL VENOUS BLD VENIPUNCTURE: CPT | Performed by: INTERNAL MEDICINE

## 2024-07-10 PROCEDURE — 86780 TREPONEMA PALLIDUM: CPT | Performed by: INTERNAL MEDICINE

## 2024-07-10 PROCEDURE — G0103 PSA SCREENING: HCPCS | Performed by: INTERNAL MEDICINE

## 2024-07-10 PROCEDURE — G2211 COMPLEX E/M VISIT ADD ON: HCPCS | Performed by: INTERNAL MEDICINE

## 2024-07-10 RX ORDER — AMLODIPINE BESYLATE 10 MG/1
10 TABLET ORAL DAILY
Qty: 90 TABLET | Refills: 1 | Status: SHIPPED | OUTPATIENT
Start: 2024-07-10

## 2024-07-10 RX ORDER — TAMSULOSIN HYDROCHLORIDE 0.4 MG/1
CAPSULE ORAL
Qty: 90 CAPSULE | Refills: 1 | Status: SHIPPED | OUTPATIENT
Start: 2024-07-10

## 2024-07-10 NOTE — PROGRESS NOTES
Assessment & Plan     (L98.9) Skin lesion  (primary encounter diagnosis)  Comment: lesions, arms, legs--denies bug bites. Penile lesion is not painful though does not look typical for STI. Less likely STI/syphilis though will need to rule out.  Plan: Adult Dermatology  Referral, Chlamydia        trachomatis/Neisseria gonorrhoeae by PCR         (first-voided urine), HIV Antigen Antibody         Combo Cascade, Treponema Abs w Reflex to RPR         and Titer        Derm referral, screening tests for now.    (I10) Essential hypertension  Comment: uncontrolled, not on medication regularly  Plan: amLODIPine (NORVASC) 10 MG tablet        Advised that adherence to his regimen is important. Lifestyle modifications discussed.    (N40.0) Enlarged prostate  Comment: BPH like sx. Again, not regularly taking his flomax  Plan: tamsulosin (FLOMAX) 0.4 MG capsule        Restart. If still not helpful, then will have him see urology.    (Z12.5) Screening for prostate cancer  Comment: requested  Plan: PSA, screen        Ordered.    See me again in 4 months or so.    The longitudinal plan of care for the diagnosis(es)/condition(s) as documented were addressed during this visit. Due to the added complexity in care, I will continue to support Shaw in the subsequent management and with ongoing continuity of care.                 Subjective   Shaw is a 63 year old, presenting for the following health issues:  Follow Up (Had referral for dermatology in March but did not yet go/Noticing more spots/lesions on his skin since last visit.), Urinary Problem (Has been having issue with urinary retention, wondering if he needs prostate checked./Has not been very compliant with medications ), and Penis/Scrotum Problem (Has a scab on top of penis, sometimes is open and weeping. Noticed this problem within the last year. Reports one sexual partner who is not new. )      7/10/2024    10:42 AM   Additional Questions   Roomed by RODRIGUEZ Wiseman  "    History of Present Illness       Reason for visit:  Check up    He eats 0-1 servings of fruits and vegetables daily.He consumes 4 sweetened beverage(s) daily.He exercises with enough effort to increase his heart rate 9 or less minutes per day.  He exercises with enough effort to increase his heart rate 3 or less days per week. He is missing 3 dose(s) of medications per week.                     Objective    BP (!) 160/80 (BP Location: Left arm, Patient Position: Sitting, Cuff Size: Adult Regular)   Pulse 60   Temp (!) 96.5  F (35.8  C) (Tympanic)   Resp 10   Ht 1.676 m (5' 6\")   Wt 57.6 kg (126 lb 14.4 oz)   SpO2 99%   BMI 20.48 kg/m    Body mass index is 20.48 kg/m .  Physical Exam   Gen:nad  Skin: darker areas of various healing lesions, dorsum left hand, forearm and inner thigh. Similar lesion in right inner calf.  Papular, non tender area of distal dorsum of penile shaft.            Signed Electronically by: Troy Ayala MD    "

## 2024-07-10 NOTE — LETTER
July 11, 2024    Shaw Navarro   [address] [place] [address]    Dear ,    We are writing to inform you of your test results. All of your tests are normal     Resulted Orders   Chlamydia trachomatis/Neisseria gonorrhoeae by PCR (first-voided urine)   Result Value Ref Range    Chlamydia Trachomatis Negative Negative      Comment:      Negative for C. trachomatis rRNA by transcription mediated amplification.   A negative result by transcription mediated amplification does not preclude the presence of infection because results are dependent on proper and adequate collection, absence of inhibitors and sufficient rRNA to be detected.    Neisseria gonorrhoeae Negative Negative      Comment:      Negative for N. gonorrhoeae rRNA by transcription mediated amplification. A negative result by transcription mediated amplification does not preclude the presence of C. trachomatis infection because results are dependent on proper and adequate collection, absence of inhibitors and sufficient rRNA to be detected.   PSA, screen   Result Value Ref Range    Prostate Specific Antigen Screen 0.58 0.00 - 4.50 ng/mL    Narrative    This result is obtained using the Roche Elecsys total PSA method on the raquel [id number] immunoassay analyzer. Results obtained with different assay methods or kits cannot be used interchangeably.   HIV Antigen Antibody Combo Cascade   Result Value Ref Range    HIV Antigen Antibody Combo Nonreactive Nonreactive      Comment:      Negative HIV-1 p24 antigen and HIV-1/2 antibody screening test results usually indicate the absence of HIV-1 and HIV-2 infection. However, such negative results do not rule-out acute HIV infection.  If acute HIV-1 or HIV-2 infection is suspected, detection of HIV-1 or HIV-2 RNA  is recommended.    Treponema Abs w Reflex to RPR and Titer   Result Value Ref Range    Treponema Antibody Total Nonreactive Nonreactive       If you have any questions or concerns, please call the  clinic at the number listed above.     Sincerely,  Troy Ayala MD

## 2024-07-11 LAB
C TRACH DNA SPEC QL PROBE+SIG AMP: NEGATIVE
N GONORRHOEA DNA SPEC QL NAA+PROBE: NEGATIVE
T PALLIDUM AB SER QL: NONREACTIVE

## 2024-10-16 ENCOUNTER — OFFICE VISIT (OUTPATIENT)
Dept: INTERNAL MEDICINE | Facility: CLINIC | Age: 64
End: 2024-10-16
Payer: COMMERCIAL

## 2024-10-16 VITALS
SYSTOLIC BLOOD PRESSURE: 144 MMHG | HEART RATE: 75 BPM | WEIGHT: 126.5 LBS | BODY MASS INDEX: 20.33 KG/M2 | TEMPERATURE: 98.1 F | HEIGHT: 66 IN | RESPIRATION RATE: 16 BRPM | DIASTOLIC BLOOD PRESSURE: 84 MMHG | OXYGEN SATURATION: 98 %

## 2024-10-16 DIAGNOSIS — Z72.0 TOBACCO ABUSE DISORDER: ICD-10-CM

## 2024-10-16 DIAGNOSIS — I10 ESSENTIAL HYPERTENSION: Primary | ICD-10-CM

## 2024-10-16 DIAGNOSIS — Z12.11 COLON CANCER SCREENING: ICD-10-CM

## 2024-10-16 PROCEDURE — G2211 COMPLEX E/M VISIT ADD ON: HCPCS | Performed by: INTERNAL MEDICINE

## 2024-10-16 PROCEDURE — 99214 OFFICE O/P EST MOD 30 MIN: CPT | Performed by: INTERNAL MEDICINE

## 2024-10-16 NOTE — PROGRESS NOTES
"  Assessment & Plan     (I10) Essential hypertension  (primary encounter diagnosis)  Comment: a bit elevated today though again he has not been taking medication regularly  Plan: strongly encouraged him to work on adherence. He is confident that this will improve. Recheck with nurse visit in 3 weeks. Additional management, if needed.    (Z72.0) Tobacco abuse disorder  Comment: working on this  Plan: wants to hold on medication therapy though is actively working on complete cessation.    (Z12.11) Colon cancer screening  Comment: reviewed 2016 colonoscopy in  system. Looks like had a tubular adenoma  Plan: Colonoscopy Screening  Referral        Now due, ordered. Discussed.    Stated that he may be leaving area, was taking care of father recently who passed after a brief COVID related illness. He is grieving, though he  only 2 weeks ago.    Encouraged him to follow up as needed in the interim, otherwise, plan to see him in 3 months.    Will consider Flu later at pharmacy. Encouraged him to get shingles vaccine as well.    The longitudinal plan of care for the diagnosis(es)/condition(s) as documented were addressed during this visit. Due to the added complexity in care, I will continue to support Shaw in the subsequent management and with ongoing continuity of care.             Subjective   Shaw is a 64 year old, presenting for the following health issues:  RECHECK        10/16/2024     3:46 PM   Additional Questions   Roomed by Dejan GRESHAM RN     History of Present Illness       Reason for visit:  Follow up He is missing 3 dose(s) of medications per week.  He is not taking prescribed medications regularly due to remembering to take.                     Objective    BP (!) 144/84 (BP Location: Left arm, Patient Position: Sitting, Cuff Size: Adult Regular)   Pulse 75   Temp 98.1  F (36.7  C) (Tympanic)   Resp 16   Ht 1.676 m (5' 6\")   Wt 57.4 kg (126 lb 8 oz)   SpO2 98%   BMI 20.42 kg/m    Body mass " index is 20.42 kg/m .  Physical Exam               Signed Electronically by: Troy Ayala MD

## 2025-02-24 DIAGNOSIS — R06.02 SOB (SHORTNESS OF BREATH): ICD-10-CM

## 2025-02-24 RX ORDER — ALBUTEROL SULFATE 90 UG/1
INHALANT RESPIRATORY (INHALATION)
Qty: 18 G | Refills: 4 | Status: SHIPPED | OUTPATIENT
Start: 2025-02-24